# Patient Record
Sex: MALE | Race: WHITE | NOT HISPANIC OR LATINO | Employment: FULL TIME | URBAN - METROPOLITAN AREA
[De-identification: names, ages, dates, MRNs, and addresses within clinical notes are randomized per-mention and may not be internally consistent; named-entity substitution may affect disease eponyms.]

---

## 2017-01-29 ENCOUNTER — NURSE TRIAGE (OUTPATIENT)
Dept: ADMINISTRATIVE | Facility: CLINIC | Age: 47
End: 2017-01-29

## 2017-01-29 ENCOUNTER — HOSPITAL ENCOUNTER (EMERGENCY)
Facility: HOSPITAL | Age: 47
Discharge: HOME | End: 2017-01-29
Attending: SURGERY
Payer: MEDICAID

## 2017-01-29 VITALS
TEMPERATURE: 98 F | DIASTOLIC BLOOD PRESSURE: 72 MMHG | WEIGHT: 176 LBS | HEIGHT: 69 IN | BODY MASS INDEX: 26.07 KG/M2 | SYSTOLIC BLOOD PRESSURE: 126 MMHG | HEART RATE: 72 BPM | RESPIRATION RATE: 20 BRPM

## 2017-01-29 DIAGNOSIS — M79.662 PAIN OF LEFT CALF: ICD-10-CM

## 2017-01-29 LAB
ALBUMIN SERPL BCP-MCNC: 4.2 G/DL
ALP SERPL-CCNC: 78 U/L
ALT SERPL W/O P-5'-P-CCNC: 20 U/L
ANION GAP SERPL CALC-SCNC: 11 MMOL/L
AST SERPL-CCNC: 19 U/L
BASOPHILS # BLD AUTO: 0.02 K/UL
BASOPHILS NFR BLD: 0.3 %
BILIRUB SERPL-MCNC: 0.8 MG/DL
BUN SERPL-MCNC: 20 MG/DL
CALCIUM SERPL-MCNC: 9 MG/DL
CHLORIDE SERPL-SCNC: 102 MMOL/L
CO2 SERPL-SCNC: 26 MMOL/L
CREAT SERPL-MCNC: 1 MG/DL
DIFFERENTIAL METHOD: ABNORMAL
EOSINOPHIL # BLD AUTO: 0.1 K/UL
EOSINOPHIL NFR BLD: 1.3 %
ERYTHROCYTE [DISTWIDTH] IN BLOOD BY AUTOMATED COUNT: 12.6 %
EST. GFR  (AFRICAN AMERICAN): >60 ML/MIN/1.73 M^2
EST. GFR  (NON AFRICAN AMERICAN): >60 ML/MIN/1.73 M^2
GLUCOSE SERPL-MCNC: 108 MG/DL
HCT VFR BLD AUTO: 39.7 %
HGB BLD-MCNC: 13.5 G/DL
LYMPHOCYTES # BLD AUTO: 2.1 K/UL
LYMPHOCYTES NFR BLD: 27 %
MCH RBC QN AUTO: 30.3 PG
MCHC RBC AUTO-ENTMCNC: 34 %
MCV RBC AUTO: 89 FL
MONOCYTES # BLD AUTO: 0.7 K/UL
MONOCYTES NFR BLD: 8.7 %
NEUTROPHILS # BLD AUTO: 4.8 K/UL
NEUTROPHILS NFR BLD: 62.7 %
PLATELET # BLD AUTO: 202 K/UL
PMV BLD AUTO: 9.5 FL
POTASSIUM SERPL-SCNC: 4 MMOL/L
PROT SERPL-MCNC: 7.4 G/DL
RBC # BLD AUTO: 4.45 M/UL
SODIUM SERPL-SCNC: 139 MMOL/L
WBC # BLD AUTO: 7.59 K/UL

## 2017-01-29 PROCEDURE — 85025 COMPLETE CBC W/AUTO DIFF WBC: CPT

## 2017-01-29 PROCEDURE — 36415 COLL VENOUS BLD VENIPUNCTURE: CPT

## 2017-01-29 PROCEDURE — 80053 COMPREHEN METABOLIC PANEL: CPT

## 2017-01-29 PROCEDURE — 99284 EMERGENCY DEPT VISIT MOD MDM: CPT

## 2017-01-29 NOTE — ED AVS SNAPSHOT
[facility]  [address] [address]               Jordan Prater   2017 10:56 PM   ED    Description:  Male : 1970   Department:  [facility]           Your Care was Coordinated By:     Provider Role From To    Michael Cardona MD Attending Provider 17 0176 --      Reason for Visit     Leg Pain           Diagnoses this Visit        Comments    Pain of left calf           ED Disposition     ED Disposition Condition Comment    Discharge             To Do List           Follow-up Information     Follow up with Anahy Marino MD. Schedule an appointment as soon as possible for a visit in 2 days.    Specialty:  Family Medicine    Contact information:    [address]  [facility] [id number] [phone]        [facility]     [facility] -  Assistance  Registered nurses in the [facility] provide clinical advisement, health education, appointment booking, and other advisory services.  Call for this free service at [phone].             Medications           Message regarding Medications     Verify the changes and/or additions to your medication regime listed below are the same as discussed with your clinician today.  If any of these changes or additions are incorrect, please notify your healthcare provider.             Verify that the below list of medications is an accurate representation of the medications you are currently taking.  If none reported, the list may be blank. If incorrect, please contact your healthcare provider. Carry this list with you in case of emergency.           Current Medications     escitalopram oxalate (LEXAPRO) 20 MG tablet Take 10 mg by mouth once daily.    ibuprofen (ADVIL,MOTRIN) 800 MG tablet Take 800 mg by mouth every 6 (six) hours as needed for Pain.           Clinical Reference Information           Your Vitals Were     BP Pulse Temp Height Weight BMI    134/89  "(BP Location: Left arm, Patient Position: Sitting) 84 97.3 °F (36.3 °C) (Oral) 5' 9" (1.753 m) 79.8 kg (176 lb) 25.99 kg/m2      Allergies as of 1/29/2017        Reactions    Chlorhexidine Towelette Itching    Tramadol Itching    Codeine Other (See Comments)    delusional      Immunizations Administered on Date of Encounter - 1/29/2017     None      ED Micro, Lab, POCT     Start Ordered       Status Ordering Provider    01/29/17 2254 01/29/17 2254  Comprehensive metabolic panel  STAT      Final result     01/29/17 2254 01/29/17 2254  CBC auto differential  STAT      Final result       ED Imaging Orders     Start Ordered       Status Ordering Provider    01/29/17 2254 01/29/17 2254  US Lower Extremity Veins Left  1 time imaging      In process         Discharge Instructions         Possible Causes of Low Back or Leg Pain    The symptoms in your back or leg may be due to pressure on a nerve. This pressure may be caused by a damaged disk or by abnormal bone growth. Either way, you may feel pain, burning, tingling, or numbness. If you have pressure on a nerve that connects to the sciatic nerve, pain may shoot down your leg.    Pressure from the disk  Constant wear and tear can weaken a disk over time and cause back pain. The disk can then be damaged by a sudden movement or injury. If its soft center begins to bulge, the disk may press on a nerve. Or the outside of the disk may tear, and the soft center may squeeze through and pinch a nerve.    Pressure from bone  As a disk wears out, the vertebrae right above and below the disk begin to touch. This can put pressure on a nerve. Often, abnormal bone (called bone spurs) grows where the vertebrae rub against each other. This can cause the foramen or the spinal canal to narrow (called stenosis) and press against a nerve.  © 3411-1486 [employer] [address] [place] [address], [place] [address]. All rights reserved. This information is not intended as a substitute for " professional medical care. Always follow your healthcare professional's instructions.          Your Scheduled Appointments     Feb 02, 2017 11:30 AM CST   Mri Lower Ext W/O with [facility] MRI1   [facility] ([facility])    [address] [place] [id number] [phone]            Apr 17, 2017  9:45 AM CDT   Established Patient Visit with [facility] DEISI POWERS  Orthopedics ([facility])    [address] [facility] [address] [phone]              Smoking Cessation     If you would like to quit smoking:   You may be eligible for free services if you are a [place] resident and started smoking cigarettes before September 1, 1988.  Call the Smoking Cessation Trust (SCT) toll free at (547) 009-5496 or (601) 159-8347.   Call 1-800-QUIT-NOW if you do not meet the above criteria.             [facility] complies with applicable Federal civil rights laws and does not discriminate on the basis of race, color, national origin, age, disability, or sex.        Language Assistance Services     ATTENTION: Language assistance services are available, free of charge. Please call [phone].      ATENCIÓN: Si habla español, tiene a moran disposición servicios gratuitos de asistencia lingüística. Llame al [phone].     CHÚ Ý: N?u b?n nói Ti?ng Vi?t, có các d?ch v? h? tr? ngôn ng? mi?n phí dành cho b?n. G?i s? [phone].

## 2017-01-30 NOTE — ED TRIAGE NOTES
Patient had ACL surgery in October on his left knee.  He now has a reddened/bruised area approx 1cm in diameter surrounded by a pinkish rash looking area to his left calf.  It is not hot to touch or painful.

## 2017-01-30 NOTE — DISCHARGE INSTRUCTIONS
Possible Causes of Low Back or Leg Pain    The symptoms in your back or leg may be due to pressure on a nerve. This pressure may be caused by a damaged disk or by abnormal bone growth. Either way, you may feel pain, burning, tingling, or numbness. If you have pressure on a nerve that connects to the sciatic nerve, pain may shoot down your leg.    Pressure from the disk  Constant wear and tear can weaken a disk over time and cause back pain. The disk can then be damaged by a sudden movement or injury. If its soft center begins to bulge, the disk may press on a nerve. Or the outside of the disk may tear, and the soft center may squeeze through and pinch a nerve.    Pressure from bone  As a disk wears out, the vertebrae right above and below the disk begin to touch. This can put pressure on a nerve. Often, abnormal bone (called bone spurs) grows where the vertebrae rub against each other. This can cause the foramen or the spinal canal to narrow (called stenosis) and press against a nerve.  © 4997-0072 [employer] [address] [place] [address], [place] [address]. All rights reserved. This information is not intended as a substitute for professional medical care. Always follow your healthcare professional's instructions.

## 2017-01-30 NOTE — ED PROVIDER NOTES
[facility]                                        January 29, 2017                     Chief Complaint  46 y.o. male with Leg Pain (reddened area to left calf area, worried it may be a blood clot)    History of Present Illness  Jordan Prater presents to the emergency room with left calf pain tonight  Patient had left ACL surgery in October 2016 with continued postop issues  Patient states he has not been able to weight-bear on his left leg since surgery  Patient noticed mild left calf tenderness tonight, worried about a blood clot now  Patient has mild tenderness to left calf with a negative Homans sign noted now  Pt has good distal pulses and capillary refill, neurovascularly intact on exam  Patient states she has an MRI scheduled next week to reassess his left knee    The history is provided by the patient  Past medical history: Bipolar disorder  Past surgical history: Appendectomy, tonsillectomy, hernia surgery, ACL repair  ALLERGIES: Chlorhexidine, tramadol, codeine    Review of Systems and Physical Exam     Review of Systems  -- Constitution - no fever, denies fatigue, no weakness, no chills  -- Eyes - no tearing or redness, no visual disturbance  -- Ear, Nose - no tinnitus or earache, no nasal congestion or discharge  -- Mouth,Throat - no sore throat, no toothache, normal voice, normal swallowing  -- Respiratory - denies cough and congestion, no shortness of breath, no KAUFMAN  -- Cardiovascular - denies chest pain, no palpitations, denies claudication  -- Gastrointestinal - denies abdominal pain, nausea, vomiting, or diarrhea  -- Musculoskeletal - left calf pain  -- Neurological - no headache, denies weakness or seizure; no LOC  -- Skin - denies pallor, rash, or changes in skin. no hives or welts noted    Vital Signs  -- His blood pressure is 134/89 and his pulse is 84.      Physical Exam  -- Nursing note and vitals reviewed  -- Constitutional: Appears well-developed and well-nourished  --  Head: Atraumatic. Normocephalic. No obvious abnormality  -- Eyes: Pupils are equal and reactive to light. Normal conjunctiva and lids  -- Cardiac: Normal rate, regular rhythm and normal heart sounds  -- Pulmonary: Normal respiratory effort, breath sounds clear to auscultation  -- Abdominal: Soft, no tenderness. Normal bowel sounds. Normal liver edge  -- Musculoskeletal: Minimal left calf swelling and tenderness, negative Homans sign  -- Neurological: No focal deficits. Showed good interaction with staff  -- Vascular: Posterior tibial, dorsalis pedis and radial pulses 2+ bilaterally      Emergency Room Course     Treatment and Evaluation  -- The US of the lower extremity performed in the ER today was negative for DVT    -- The electrolytes drawn in the ER today were within normal limits   -- The CBC drawn in the ER today was within normal limits     Diagnosis  -- The encounter diagnosis was Pain of left calf.    Disposition and Plan  -- Disposition: home  -- Condition: stable  -- Follow-up: Patient to follow up with Anahy Marino MD in 1-2 days.  -- I advised the patient that we have found no life threatening condition today  -- At this time, I believe the patient is clinically stable for discharge.   -- The patient acknowledges that close follow up with a MD is required   -- Patient agrees to comply with all instruction and direction given in the ER    This note is dictated on Dragon Natural Speaking word recognition program.  There are word recognition mistakes that are occasionally missed on review.           Michael Cardona MD  01/29/17 0372

## 2017-01-30 NOTE — TELEPHONE ENCOUNTER
Reason for Disposition   Nursing judgment    Protocols used: ST NO GUIDELINE OR REFERENCE [id number]    Patient states his left calf is swollen and red and he thinks that it may be a blood clot but it is not painful. He states his feet are cold. Advised patient to go the ED for evaluation. He verbalized understanding.

## 2017-02-02 ENCOUNTER — HOSPITAL ENCOUNTER (OUTPATIENT)
Dept: RADIOLOGY | Facility: HOSPITAL | Age: 47
Discharge: HOME | End: 2017-02-02
Attending: ORTHOPAEDIC SURGERY
Payer: OTHER MISCELLANEOUS

## 2017-02-02 DIAGNOSIS — M25.662 KNEE STIFF, LEFT: ICD-10-CM

## 2017-02-02 DIAGNOSIS — Z98.890 HISTORY OF KNEE SURGERY: ICD-10-CM

## 2017-02-02 PROCEDURE — 73721 MRI JNT OF LWR EXTRE W/O DYE: CPT | Mod: 26,LT,, | Performed by: RADIOLOGY

## 2017-02-02 PROCEDURE — 73721 MRI JNT OF LWR EXTRE W/O DYE: CPT | Mod: TC,LT

## 2017-03-21 ENCOUNTER — HOSPITAL ENCOUNTER (EMERGENCY)
Facility: HOSPITAL | Age: 47
Discharge: HOME | End: 2017-03-21
Attending: SURGERY
Payer: MEDICAID

## 2017-03-21 VITALS
WEIGHT: 176 LBS | TEMPERATURE: 96 F | HEART RATE: 65 BPM | OXYGEN SATURATION: 100 % | DIASTOLIC BLOOD PRESSURE: 87 MMHG | BODY MASS INDEX: 26.07 KG/M2 | RESPIRATION RATE: 15 BRPM | HEIGHT: 69 IN | SYSTOLIC BLOOD PRESSURE: 128 MMHG

## 2017-03-21 DIAGNOSIS — R07.89 CHEST PAIN, NON-CARDIAC: Primary | ICD-10-CM

## 2017-03-21 LAB
ALBUMIN SERPL BCP-MCNC: 4.1 G/DL
ALP SERPL-CCNC: 91 U/L
ALT SERPL W/O P-5'-P-CCNC: 29 U/L
ANION GAP SERPL CALC-SCNC: 11 MMOL/L
APTT BLDCRRT: 27 SEC
AST SERPL-CCNC: 21 U/L
BASOPHILS # BLD AUTO: 0.02 K/UL
BASOPHILS NFR BLD: 0.3 %
BILIRUB SERPL-MCNC: 0.9 MG/DL
BNP SERPL-MCNC: <10 PG/ML
BUN SERPL-MCNC: 18 MG/DL
CALCIUM SERPL-MCNC: 9.4 MG/DL
CHLORIDE SERPL-SCNC: 103 MMOL/L
CK MB SERPL-MCNC: 0.6 NG/ML
CK MB SERPL-RTO: 0.9 %
CK SERPL-CCNC: 64 U/L
CK SERPL-CCNC: 64 U/L
CO2 SERPL-SCNC: 28 MMOL/L
CREAT SERPL-MCNC: 0.9 MG/DL
D DIMER PPP IA.FEU-MCNC: 0.67 MG/L FEU
DIFFERENTIAL METHOD: NORMAL
EOSINOPHIL # BLD AUTO: 0 K/UL
EOSINOPHIL NFR BLD: 0.6 %
ERYTHROCYTE [DISTWIDTH] IN BLOOD BY AUTOMATED COUNT: 12.9 %
EST. GFR  (AFRICAN AMERICAN): >60 ML/MIN/1.73 M^2
EST. GFR  (NON AFRICAN AMERICAN): >60 ML/MIN/1.73 M^2
GLUCOSE SERPL-MCNC: 90 MG/DL
HCT VFR BLD AUTO: 41.4 %
HGB BLD-MCNC: 14.1 G/DL
INR PPP: 0.9
LYMPHOCYTES # BLD AUTO: 1.2 K/UL
LYMPHOCYTES NFR BLD: 18.7 %
MCH RBC QN AUTO: 30.4 PG
MCHC RBC AUTO-ENTMCNC: 34.1 %
MCV RBC AUTO: 89 FL
MONOCYTES # BLD AUTO: 0.5 K/UL
MONOCYTES NFR BLD: 8.4 %
NEUTROPHILS # BLD AUTO: 4.6 K/UL
NEUTROPHILS NFR BLD: 72 %
PLATELET # BLD AUTO: 235 K/UL
PMV BLD AUTO: 9.4 FL
POTASSIUM SERPL-SCNC: 4.1 MMOL/L
PROT SERPL-MCNC: 7.8 G/DL
PROTHROMBIN TIME: 9.4 SEC
RBC # BLD AUTO: 4.64 M/UL
SODIUM SERPL-SCNC: 142 MMOL/L
TROPONIN I SERPL DL<=0.01 NG/ML-MCNC: <0.006 NG/ML
TROPONIN I SERPL DL<=0.01 NG/ML-MCNC: <0.006 NG/ML
WBC # BLD AUTO: 6.42 K/UL

## 2017-03-21 PROCEDURE — 85025 COMPLETE CBC W/AUTO DIFF WBC: CPT

## 2017-03-21 PROCEDURE — 25500020 PHARM REV CODE 255: Performed by: SURGERY

## 2017-03-21 PROCEDURE — 63600175 PHARM REV CODE 636 W HCPCS: Performed by: SURGERY

## 2017-03-21 PROCEDURE — 99285 EMERGENCY DEPT VISIT HI MDM: CPT | Mod: 25

## 2017-03-21 PROCEDURE — 83880 ASSAY OF NATRIURETIC PEPTIDE: CPT

## 2017-03-21 PROCEDURE — 80053 COMPREHEN METABOLIC PANEL: CPT

## 2017-03-21 PROCEDURE — 96372 THER/PROPH/DIAG INJ SC/IM: CPT

## 2017-03-21 PROCEDURE — 85379 FIBRIN DEGRADATION QUANT: CPT

## 2017-03-21 PROCEDURE — 93005 ELECTROCARDIOGRAM TRACING: CPT

## 2017-03-21 PROCEDURE — 25000003 PHARM REV CODE 250: Performed by: SURGERY

## 2017-03-21 PROCEDURE — 84484 ASSAY OF TROPONIN QUANT: CPT

## 2017-03-21 PROCEDURE — 93010 ELECTROCARDIOGRAM REPORT: CPT | Mod: ,,, | Performed by: INTERNAL MEDICINE

## 2017-03-21 PROCEDURE — 85610 PROTHROMBIN TIME: CPT

## 2017-03-21 PROCEDURE — 85730 THROMBOPLASTIN TIME PARTIAL: CPT

## 2017-03-21 PROCEDURE — 27000494 *HC OXYGEN SET UP (STAH ONLY)

## 2017-03-21 PROCEDURE — 82553 CREATINE MB FRACTION: CPT

## 2017-03-21 PROCEDURE — 36415 COLL VENOUS BLD VENIPUNCTURE: CPT

## 2017-03-21 RX ORDER — PANTOPRAZOLE SODIUM 40 MG/1
40 TABLET, DELAYED RELEASE ORAL DAILY
Qty: 30 TABLET | Refills: 0 | Status: SHIPPED | OUTPATIENT
Start: 2017-03-21 | End: 2018-05-20

## 2017-03-21 RX ORDER — OXYCODONE AND ACETAMINOPHEN 10; 325 MG/1; MG/1
1 TABLET ORAL EVERY 4 HOURS PRN
COMMUNITY
End: 2017-06-20

## 2017-03-21 RX ORDER — NAPROXEN SODIUM 220 MG/1
81 TABLET, FILM COATED ORAL
Status: COMPLETED | OUTPATIENT
Start: 2017-03-21 | End: 2017-03-21

## 2017-03-21 RX ORDER — ENOXAPARIN SODIUM 100 MG/ML
1 INJECTION SUBCUTANEOUS
Status: COMPLETED | OUTPATIENT
Start: 2017-03-21 | End: 2017-03-21

## 2017-03-21 RX ORDER — HYDROCODONE BITARTRATE AND ACETAMINOPHEN 5; 325 MG/1; MG/1
1 TABLET ORAL EVERY 6 HOURS PRN
COMMUNITY
End: 2017-06-20

## 2017-03-21 RX ADMIN — IOHEXOL 75 ML: 350 INJECTION, SOLUTION INTRAVENOUS at 10:03

## 2017-03-21 RX ADMIN — ASPIRIN 81 MG: 81 TABLET, CHEWABLE ORAL at 09:03

## 2017-03-21 RX ADMIN — ENOXAPARIN SODIUM 80 MG: 80 INJECTION, SOLUTION INTRAVENOUS; SUBCUTANEOUS at 11:03

## 2017-03-21 NOTE — CONSULTS
[facility]  Cardiology  Consult Note    Patient Name: Jordan Prater  MRN: [id number]  Admission Date: 3/21/2017  Hospital Length of Stay: 0 days  Code Status: No Order   Attending Provider: No att. providers found   Consulting Provider: Hardeep Nunez NP  Primary Care Physician: Anahy Marino MD  Principal Problem:<principal problem not specified>        Inpatient consult to [facility]  Consult performed by: CHINO MARTIN  Consult ordered by: MEDINA HERNÁNDEZ        Subjective:     Chief Complaint:  CP    HPI: 47 y/o male with PMHx of bipolar disorder presents to the ED with c/o left sided chest pain into his axilla area. Pt also states that hid pain in worse with deep inspiration. He also presents with c/o KAUFMAN with moderate exertion. EKG with no signs of ischemia, Osbaldo negative, but CT non conclusive for PE. Of note, pt has recently undergone left knee sx. [facility] asked to evaluate.     Past Medical History:   Diagnosis Date    ACL tear     Bipolar 1 disorder     Tobacco abuse        Past Surgical History:   Procedure Laterality Date    ANTERIOR CRUCIATE LIGAMENT REPAIR Left 10/14/2016    APPENDECTOMY      HERNIA REPAIR      TONSILLECTOMY         Review of patient's allergies indicates:   Allergen Reactions    Chlorhexidine towelette Itching    Tramadol Itching    Codeine Other (See Comments)     delusional       No current facility-administered medications on file prior to encounter.      Current Outpatient Prescriptions on File Prior to Encounter   Medication Sig    escitalopram oxalate (LEXAPRO) 20 MG tablet Take 10 mg by mouth once daily.    ibuprofen (ADVIL,MOTRIN) 800 MG tablet Take 800 mg by mouth every 6 (six) hours as needed for Pain.     Family History     Problem Relation (Age of Onset)    Heart attack Father    No Known Problems Mother, Maternal Grandmother, Maternal Grandfather, Paternal Grandmother, Paternal Grandfather        Social History Main Topics     Smoking status: Former Smoker     Years: 25.00    Smokeless tobacco: Current User     Types: Chew    Alcohol use Yes      Comment: occ    Drug use: No    Sexual activity: Yes     Partners: Female     ROS     Constitutional : Negative  EENT : Negative  CV : Negative  Respiratory : Negative  Gastrointestinal: Negative   Genitourinary: Negative  Musculoskeletal: left knee immobilizer   Skin : Negative  Neurological : AAO x 3     Objective:     Vital Signs (Most Recent):  Temp: 96 °F (35.6 °C) (03/21/17 0938)  Pulse: 65 (03/21/17 1145)  Resp: 15 (03/21/17 1145)  BP: 128/87 (03/21/17 1145)  SpO2: 100 % (03/21/17 1145) Vital Signs (24h Range):  Temp:  [96 °F (35.6 °C)] 96 °F (35.6 °C)  Pulse:  [65-82] 65  Resp:  [14-27] 15  SpO2:  [100 %] 100 %  BP: (122-131)/(78-90) 128/87     Weight: 79.8 kg (176 lb)  Body mass index is 25.99 kg/(m^2).    SpO2: 100 %       No intake or output data in the 24 hours ending 03/21/17 1216    Lines/Drains/Airways     Peripheral Intravenous Line                 Peripheral IV - Single Lumen 03/21/17 1033 Left Antecubital less than 1 day                Physical Exam     General appearance: alert, appears stated age and cooperative  Head: Normocephalic, without obvious abnormality, atraumatic  Eyes: conjunctivae/corneas clear. PERRL  Neck: no carotid bruit, no JVD and supple, symmetrical, trachea midline  Lungs: clear to auscultation bilaterally, normal respiratory effort  Chest Wall: no tenderness  Heart: regular rate and rhythm, S1, S2 normal, no murmur, click, rub or gallop  Abdomen: soft, non-tender; bowel sounds normal; no masses,  no organomegaly  Extremities: Extremities normal, atraumatic, no cyanosis, clubbing, or edema  Pulses: Dorsalis Pedis R: 2+ (normal)/L: 2+ (normal)  Skin: Skin color, texture, turgor normal. No rashes or lesions  Neurologic: Normal mood and affect  Alert and oriented X 3      Significant Labs:   ABG: No results for input(s): PH, PCO2, HCO3, POCSATURATED, BE in  the last 48 hours., Blood Culture: No results for input(s): LABBLOO in the last 48 hours., BMP:   Recent Labs  Lab 03/21/17  0953   GLU 90      K 4.1      CO2 28   BUN 18   CREATININE 0.9   CALCIUM 9.4   , CMP   Recent Labs  Lab 03/21/17  0953      K 4.1      CO2 28   GLU 90   BUN 18   CREATININE 0.9   CALCIUM 9.4   PROT 7.8   ALBUMIN 4.1   BILITOT 0.9   ALKPHOS 91   AST 21   ALT 29   ANIONGAP 11   ESTGFRAFRICA >60   EGFRNONAA >60   , CBC   Recent Labs  Lab 03/21/17  0953   WBC 6.42   HGB 14.1   HCT 41.4      , INR   Recent Labs  Lab 03/21/17  0953   INR 0.9   , Lipid Panel No results for input(s): CHOL, HDL, LDLCALC, TRIG, CHOLHDL in the last 48 hours.,   Pathology Results  (Last 10 years)               03/21/17 0953  CBC auto differential Final result    01/29/17 2304 (A) CBC auto differential (Abnormal) Final result    09/26/16 1121 (A) CBC auto differential (Abnormal) Final result       and Troponin   Recent Labs  Lab 03/21/17  0953 03/21/17  1142   TROPONINI <0.006 <0.006         Assessment and Plan:     There are no hospital problems to display for this patient.      VTE Risk Mitigation     None          Atypical chest pain and Dyspnea on exertion  CT suggestive of PE  S/p L knee surgery 10/16 and again 2 wks ago  Normal ETT 2/16 LVEF 60% PAS 13, ca score 0  Bipolar disease Hx  Hardeep Nunez, YUDELKA  Cardiology   [facility]    PLAN: anticoagulate for 6 months    I attest that I have personally seen and examined this patient. I have reviewed and discussed the management in detail as outlined above.

## 2017-03-21 NOTE — ED AVS SNAPSHOT
[facility] [address] [place] [address]               Jordan Prater   3/21/2017  9:33 AM   ED    Description:  Male : 1970   Department:  [facility]           Your Care was Coordinated By:     Provider Role From To    Michael Cardona MD Attending Provider 17 0928 --      Reason for Visit     Nausea     Chest Pain           Diagnoses this Visit        Comments    Chest pain, non-cardiac    -  Primary       ED Disposition     ED Disposition Condition Comment    Discharge             To Do List           Follow-up Information     Follow up with ANNA BUSBY. Schedule an appointment as soon as possible for a visit in 2 days.    Contact information:    [address] [place] [address]  [place] [address] [phone]         These Medications        Disp Refills Start End    pantoprazole (PROTONIX) 40 MG tablet 30 tablet 0 3/21/2017 2017    Take 1 tablet (40 mg total) by mouth once daily. - Oral    Pharmacy: [address] Ph #: [phone]       apixaban 5 mg Tab 28 tablet 0 3/21/2017 3/28/2017    Take 2 tablets (10 mg total) by mouth 2 (two) times daily. - Oral    Pharmacy: [address] Ph #: [phone]         [facility] On Call     [facility] On Call Nurse Care Line -  Assistance  Registered nurses in the [facility] provide clinical advisement, health education, appointment booking, and other advisory services.  Call for this free service at [phone].             Medications           Message regarding Medications     Verify the changes and/or additions to your medication regime listed below are the same as discussed with your clinician today.  If any of these changes or additions are incorrect, please notify your healthcare provider.        START taking these NEW medications        Refills    pantoprazole (PROTONIX) 40 MG tablet 0    Sig: Take 1  "tablet (40 mg total) by mouth once daily.    Class: Normal    Route: Oral    apixaban 5 mg Tab 0    Sig: Take 2 tablets (10 mg total) by mouth 2 (two) times daily.    Class: Normal    Route: Oral      These medications were administered today        Dose Freq    aspirin chewable tablet 81 mg 81 mg ED 1 Time    Sig: Take 1 tablet (81 mg total) by mouth ED 1 Time.    Class: Normal    Route: Oral    omnipaque 350 iohexol 75 mL 75 mL IMG once as needed    Sig: Inject 75 mLs into the vein ONCE PRN for contrast.    Class: Normal    Route: Intravenous    enoxaparin injection 80 mg 1 mg/kg × 79.8 kg ED 1 Time    Sig: Inject 0.8 mLs (80 mg total) into the skin ED 1 Time.    Class: Normal    Route: Subcutaneous           Verify that the below list of medications is an accurate representation of the medications you are currently taking.  If none reported, the list may be blank. If incorrect, please contact your healthcare provider. Carry this list with you in case of emergency.           Current Medications     escitalopram oxalate (LEXAPRO) 20 MG tablet Take 10 mg by mouth once daily.    apixaban 5 mg Tab Take 2 tablets (10 mg total) by mouth 2 (two) times daily.    hydrocodone-acetaminophen 5-325mg (NORCO) 5-325 mg per tablet Take 1 tablet by mouth every 6 (six) hours as needed for Pain.    ibuprofen (ADVIL,MOTRIN) 800 MG tablet Take 800 mg by mouth every 6 (six) hours as needed for Pain.    oxycodone-acetaminophen (PERCOCET)  mg per tablet Take 1 tablet by mouth every 4 (four) hours as needed for Pain.    pantoprazole (PROTONIX) 40 MG tablet Take 1 tablet (40 mg total) by mouth once daily.           Clinical Reference Information           Your Vitals Were     BP Pulse Temp Resp Height Weight    128/87 65 96 °F (35.6 °C) 15 5' 9" (1.753 m) 79.8 kg (176 lb)    SpO2 BMI             100% 25.99 kg/m2         Allergies as of 3/21/2017        Reactions    Chlorhexidine Towelette Itching    Tramadol Itching    Codeine Other " (See Comments)    delusional      Immunizations Administered on Date of Encounter - 3/21/2017     None      ED Micro, Lab, POCT     Start Ordered       Status Ordering Provider    03/21/17 1128 03/21/17 1127  Troponin I  STAT      Final result     03/21/17 0932 03/21/17 0932  Comprehensive metabolic panel  STAT      Final result     03/21/17 0932 03/21/17 0932  CBC auto differential  STAT      Final result     03/21/17 0932 03/21/17 0932  Troponin I  Now then every 6 hours     Start Status   03/21/17 0932 Final result   03/21/17 1532 Acknowledged   03/21/17 2132 Scheduled   03/22/17 0332 Scheduled   03/22/17 0932 Scheduled   03/22/17 1532 Scheduled   03/22/17 2132 Scheduled   03/23/17 0332 Scheduled   03/23/17 0932 Scheduled   03/23/17 1532 Scheduled   03/23/17 2132 Scheduled   03/24/17 0332 Scheduled   03/24/17 0932 Scheduled   03/24/17 1532 Scheduled   03/24/17 2132 Scheduled       Acknowledged     03/21/17 0932 03/21/17 0932  CK  STAT      Final result     03/21/17 0932 03/21/17 0932  CK-MB  STAT      Final result     03/21/17 0932 03/21/17 0932  Brain natriuretic peptide  STAT      Final result     03/21/17 0932 03/21/17 0932  APTT  STAT      Final result     03/21/17 0932 03/21/17 0932  Protime-INR  STAT      Final result     03/21/17 0932 03/21/17 0932  D dimer, quantitative  STAT      Final result       ED Imaging Orders     Start Ordered       Status Ordering Provider    03/21/17 1103 03/21/17 1103  US Lower Extremity Veins Bilateral  1 time imaging      Final result     03/21/17 1023 03/21/17 1022  CTA Chest Non-Coronary  1 time imaging      Final result     03/21/17 0932 03/21/17 0932  X-Ray Chest 1 View  1 time imaging      Final result         Discharge Instructions         Noncardiac Chest Pain    Based on your visit today, the health care provider doesnt know what is causing your chest pain. In most cases, people who come to the emergency department with chest pain dont have a problem with their  heart. Instead, the pain is caused by other conditions. These may be problems with the lungs, muscles, bones, digestive tract, nerves, or mental health.  Lung problems  · Inflammation around the lungs (pleurisy)  · Collapsed lung (pneumothorax)  · Fluid around the lungs (pleural effusion)  · Lung cancer. This is a rare cause of chest pain.  Muscle or bone problems  · Inflamed cartilage between the ribs (pleurisy)  · Fibromyalgia  · Rheumatoid arthritis  Digestive system problems  · Reflux  · Stomach ulcer  · Spasms of the esophagus  · Gall stones  · Gallbladder inflammation  Mental health conditions  · Panic or anxiety attacks  · Emotional distress  Your condition doesnt seem serious and your pain doesnt appear to be coming from your heart. But sometimes the signs of a serious problem take more time to appear. Watch for the warning signs listed below.  Home care  Follow these guidelines when caring for yourself at home:  · Rest today and avoid strenuous activity.  · Take any prescribed medicine as directed.  Follow-up care  Follow up with your health care provider, or as advised, if you dont start to feel better within 24 hours.  When to seek medical advice  Call your health care provider right away if any of these occur:  · A change in the type of pain. Call if it feels different, becomes more serious, lasts longer, or begins to spread into your shoulder, arm, neck, jaw, or back.  · Shortness of breath  · You feel more pain when you breathe  · Cough with dark-colored mucus or blood  · Weakness, dizziness, or fainting  · Fever of 100.4ºF (38ºC) or higher, or as directed by your health care provider  · Swelling, pain, or redness in one leg  Date Last Reviewed: 11/24/2014 © 2000-2016 [employer]. [address] [place] [address], [place] [address]. All rights reserved. This information is not intended as a substitute for professional medical care. Always follow your healthcare professional's  instructions.          Your Scheduled Appointments     Apr 17, 2017  9:45 AM CDT   Established Patient Visit with ORTHO DEISI POWERS - Orthopedics ([facility])    [address] [place] [address]  [place] [address] [phone]              Smoking Cessation     If you would like to quit smoking:   You may be eligible for free services if you are a [place] resident and started smoking cigarettes before September 1, 1988.  Call the Smoking Cessation Trust (SCT) toll free at (308) 954-5382 or (275) 875-3745.   Call 5-923-QUIT-NOW if you do not meet the above criteria.             [facility] complies with applicable Federal civil rights laws and does not discriminate on the basis of race, color, national origin, age, disability, or sex.        Language Assistance Services     ATTENTION: Language assistance services are available, free of charge. Please call [phone].      ATENCIÓN: Si habla español, tiene a moran disposición servicios gratuitos de asistencia lingüística. Llame al [phone].     CHÚ Ý: N?u b?n nói Ti?ng Vi?t, có các d?ch v? h? tr? ngôn ng? mi?n phí dành cho b?n. G?i s? [phone].

## 2017-03-21 NOTE — ED PROVIDER NOTES
[facility]                                        March 21, 2017                   Chief Complaint  46 y.o. male with Nausea and Chest Pain     History of Present Illness  Jordan Prater presents to the emergency room with chest pain for the last 3 days  Patient states he's been having anxiety, under severe stress since his left knee injury   Patient has not been able to work for the last year, very stressed out this past week  Patient is normal sinus rhythm on EKG without ST changes or concerning findings  Patient has 2 sets of negative cardiac enzymes here with a mildly elevated d-dimer  Patient had a possible PE on the left side, [facility] consulted for evaluation today in ER    The history is provided by the patient  Medical history: Anterior cruciate ligament tear, bipolar, tobacco issues  Surgical history: Anterior cruciate ligament repair, appendectomy, hernia repair, tonsillectomy  ALLERGIES: Chlorhexidine, Ultram, codeine    Review of Systems and Physical Exam     Review of Systems  -- Constitution - no fever, denies fatigue, no weakness, no chills  -- Eyes - no tearing or redness, no visual disturbance  -- Ear, Nose - no tinnitus or earache, no nasal congestion or discharge  -- Mouth,Throat - no sore throat, no toothache, normal voice, normal swallowing  -- Respiratory - denies cough and congestion, no shortness of breath, no KAUFMAN  -- Cardiovascular - chest pain, no palpitations, denies claudication  -- Gastrointestinal - denies abdominal pain, nausea, vomiting, or diarrhea  -- Musculoskeletal - denies back pain, negative for myalgias and arthralgias   -- Neurological - no headache, denies weakness or seizure; no LOC  -- Skin - denies pallor, rash, or changes in skin. no hives or welts noted    Vital Signs  -- His temperature is 96 °F (35.6 °C).   -- His blood pressure is 128/87 and his pulse is 65.   -- His respiration is 15 and oxygen saturation is 100%.      Physical Exam  -- Nursing note  and vitals reviewed  -- Head: Atraumatic. Normocephalic. No obvious abnormality  -- Eyes: Pupils are equal and reactive to light. Normal conjunctiva and lids  -- Cardiac: Normal rate, regular rhythm and normal heart sounds  -- Pulmonary: Normal respiratory effort, breath sounds clear to auscultation  -- Abdominal: Soft, no tenderness. Normal bowel sounds. Normal liver edge  -- Musculoskeletal: Normal range of motion, no effusions. Joints stable   -- Neurological: No focal deficits. Showed good interaction with staff  -- Vascular: Posterior tibial, dorsalis pedis and radial pulses 2+ bilaterally      Emergency Room Course     CT PE  -- Focal filling defect versus pseudo-defect involving segmental branches of the lingula   -- and anterior segmental branch of the left lower lobe.  It is difficult to fully evaluate secondary   -- to respiratory motion artifact in this location.  Correlation with d-dimer level and a lower   -- extremity ultrasound is recommended. Right lower lobe and left lower lobe pulmonary nodules.    -- These nodules measure 7 and 7.7 mm respectively.  There does appear to be very mild   -- emphysematous disease in the lung apices.  In a high risk patient, initial followup   -- CT in 3-6 months is recommended an 18-24 months. Alternatively, if prior imaging is   -- available, it can be submitted for comparison purposes.    Treatment and Evaluation  -- The EKG findings today were without concerning findings from baseline   -- Chest x-ray showed no infiltrate and showed no acute pathology   -- The US of the lower extremity performed in the ER today was negative for DVT    -- The electrolytes drawn in the ER today were within normal limits   -- The CBC drawn in the ER today was within normal limits   -- The cardiac enzymes were within normal limits   -- The 2nd troponin drawn in the ER today was within normal limits    -- The BNP drawn in the ER today were within normal limits   -- The PT, PTT, and INR  were within normal limits.      Medications Given  -- aspirin chewable tablet 81 mg (81 mg Oral Given 3/21/17 4153)   -- omnipaque 350 iohexol 75 mL (75 mLs Intravenous Given 3/21/17 1052)   -- enoxaparin injection 80 mg (80 mg Subcutaneous Given 3/21/17 1145)     ED Management  -- Patient with an abnormal CT study PE today, pseudo-defect versus PE  -- [facility] consult, will start the patient on blood thinners now  --  Will follow up with [facility] this week     Diagnosis  -- The encounter diagnosis was Chest pain, non-cardiac.    Disposition and Plan  -- Disposition: home  -- Condition: stable  -- Follow-up: Patient to follow up with cardiology in 1-2 days.  -- I advised the patient that we have found no life threatening condition today  -- At this time, I believe the patient is clinically stable for discharge.   -- The patient acknowledges that close follow up with a MD is required   -- Patient agrees to comply with all instruction and direction given in the ER    This note is dictated on Dragon Natural Speaking word recognition program.  There are word recognition mistakes that are occasionally missed on review.           Michael Cardona MD  03/21/17 7024

## 2017-03-21 NOTE — DISCHARGE INSTRUCTIONS
Noncardiac Chest Pain    Based on your visit today, the health care provider doesnt know what is causing your chest pain. In most cases, people who come to the emergency department with chest pain dont have a problem with their heart. Instead, the pain is caused by other conditions. These may be problems with the lungs, muscles, bones, digestive tract, nerves, or mental health.  Lung problems  · Inflammation around the lungs (pleurisy)  · Collapsed lung (pneumothorax)  · Fluid around the lungs (pleural effusion)  · Lung cancer. This is a rare cause of chest pain.  Muscle or bone problems  · Inflamed cartilage between the ribs (pleurisy)  · Fibromyalgia  · Rheumatoid arthritis  Digestive system problems  · Reflux  · Stomach ulcer  · Spasms of the esophagus  · Gall stones  · Gallbladder inflammation  Mental health conditions  · Panic or anxiety attacks  · Emotional distress  Your condition doesnt seem serious and your pain doesnt appear to be coming from your heart. But sometimes the signs of a serious problem take more time to appear. Watch for the warning signs listed below.  Home care  Follow these guidelines when caring for yourself at home:  · Rest today and avoid strenuous activity.  · Take any prescribed medicine as directed.  Follow-up care  Follow up with your health care provider, or as advised, if you dont start to feel better within 24 hours.  When to seek medical advice  Call your health care provider right away if any of these occur:  · A change in the type of pain. Call if it feels different, becomes more serious, lasts longer, or begins to spread into your shoulder, arm, neck, jaw, or back.  · Shortness of breath  · You feel more pain when you breathe  · Cough with dark-colored mucus or blood  · Weakness, dizziness, or fainting  · Fever of 100.4ºF (38ºC) or higher, or as directed by your health care provider  · Swelling, pain, or redness in one leg  Date Last Reviewed: 11/24/2014  © 4726-9940  [employer]. [address] [place] [address], [place] [address]. All rights reserved. This information is not intended as a substitute for professional medical care. Always follow your healthcare professional's instructions.

## 2017-03-21 NOTE — ED TRIAGE NOTES
Patient comes in today complaining of nausea, dizziness, arms going numb and chest pressure.  This has been going on for about 3 days.  Patient has recently had knee surgery.

## 2017-03-22 ENCOUNTER — HOSPITAL ENCOUNTER (EMERGENCY)
Facility: HOSPITAL | Age: 47
Discharge: HOME | End: 2017-03-22
Attending: SURGERY
Payer: MEDICAID

## 2017-03-22 VITALS
DIASTOLIC BLOOD PRESSURE: 70 MMHG | HEART RATE: 68 BPM | TEMPERATURE: 97 F | BODY MASS INDEX: 25.99 KG/M2 | SYSTOLIC BLOOD PRESSURE: 134 MMHG | RESPIRATION RATE: 16 BRPM | WEIGHT: 176 LBS | OXYGEN SATURATION: 99 %

## 2017-03-22 DIAGNOSIS — R07.89 NON-CARDIAC CHEST PAIN: Primary | ICD-10-CM

## 2017-03-22 LAB
ALBUMIN SERPL BCP-MCNC: 4 G/DL
ALP SERPL-CCNC: 91 U/L
ALT SERPL W/O P-5'-P-CCNC: 28 U/L
ANION GAP SERPL CALC-SCNC: 8 MMOL/L
APTT BLDCRRT: 25.6 SEC
AST SERPL-CCNC: 19 U/L
BASOPHILS # BLD AUTO: 0.01 K/UL
BASOPHILS NFR BLD: 0.2 %
BILIRUB SERPL-MCNC: 1 MG/DL
BNP SERPL-MCNC: 17 PG/ML
BUN SERPL-MCNC: 16 MG/DL
CALCIUM SERPL-MCNC: 9.1 MG/DL
CHLORIDE SERPL-SCNC: 105 MMOL/L
CK MB SERPL-MCNC: 0.6 NG/ML
CK MB SERPL-RTO: 1 %
CK SERPL-CCNC: 59 U/L
CK SERPL-CCNC: 59 U/L
CO2 SERPL-SCNC: 28 MMOL/L
CREAT SERPL-MCNC: 0.9 MG/DL
DIFFERENTIAL METHOD: ABNORMAL
EOSINOPHIL # BLD AUTO: 0 K/UL
EOSINOPHIL NFR BLD: 0.7 %
ERYTHROCYTE [DISTWIDTH] IN BLOOD BY AUTOMATED COUNT: 12.8 %
EST. GFR  (AFRICAN AMERICAN): >60 ML/MIN/1.73 M^2
EST. GFR  (NON AFRICAN AMERICAN): >60 ML/MIN/1.73 M^2
GLUCOSE SERPL-MCNC: 111 MG/DL
HCT VFR BLD AUTO: 41 %
HGB BLD-MCNC: 14 G/DL
INR PPP: 1
LYMPHOCYTES # BLD AUTO: 1.1 K/UL
LYMPHOCYTES NFR BLD: 20.3 %
MCH RBC QN AUTO: 30.6 PG
MCHC RBC AUTO-ENTMCNC: 34.1 %
MCV RBC AUTO: 90 FL
MONOCYTES # BLD AUTO: 0.4 K/UL
MONOCYTES NFR BLD: 7.3 %
NEUTROPHILS # BLD AUTO: 3.9 K/UL
NEUTROPHILS NFR BLD: 71.5 %
PLATELET # BLD AUTO: 220 K/UL
PMV BLD AUTO: 9.5 FL
POTASSIUM SERPL-SCNC: 3.9 MMOL/L
PROT SERPL-MCNC: 7.5 G/DL
PROTHROMBIN TIME: 9.9 SEC
RBC # BLD AUTO: 4.57 M/UL
SODIUM SERPL-SCNC: 141 MMOL/L
TROPONIN I SERPL DL<=0.01 NG/ML-MCNC: <0.006 NG/ML
TROPONIN I SERPL DL<=0.01 NG/ML-MCNC: <0.006 NG/ML
WBC # BLD AUTO: 5.47 K/UL

## 2017-03-22 PROCEDURE — 25000003 PHARM REV CODE 250: Performed by: SURGERY

## 2017-03-22 PROCEDURE — 80053 COMPREHEN METABOLIC PANEL: CPT

## 2017-03-22 PROCEDURE — 85025 COMPLETE CBC W/AUTO DIFF WBC: CPT

## 2017-03-22 PROCEDURE — 36415 COLL VENOUS BLD VENIPUNCTURE: CPT

## 2017-03-22 PROCEDURE — 93005 ELECTROCARDIOGRAM TRACING: CPT

## 2017-03-22 PROCEDURE — 93010 ELECTROCARDIOGRAM REPORT: CPT | Mod: ,,, | Performed by: INTERNAL MEDICINE

## 2017-03-22 PROCEDURE — 99284 EMERGENCY DEPT VISIT MOD MDM: CPT

## 2017-03-22 PROCEDURE — 82553 CREATINE MB FRACTION: CPT

## 2017-03-22 PROCEDURE — 84484 ASSAY OF TROPONIN QUANT: CPT | Mod: 91

## 2017-03-22 PROCEDURE — 85730 THROMBOPLASTIN TIME PARTIAL: CPT

## 2017-03-22 PROCEDURE — 85610 PROTHROMBIN TIME: CPT

## 2017-03-22 PROCEDURE — 83880 ASSAY OF NATRIURETIC PEPTIDE: CPT

## 2017-03-22 RX ORDER — NAPROXEN SODIUM 220 MG/1
81 TABLET, FILM COATED ORAL
Status: COMPLETED | OUTPATIENT
Start: 2017-03-22 | End: 2017-03-22

## 2017-03-22 RX ADMIN — ASPIRIN 81 MG: 81 TABLET, CHEWABLE ORAL at 01:03

## 2017-03-22 NOTE — CONSULTS
[facility]  Cardiology  Consult Note    Patient Name: Jordan Prater  MRN: [id number]  Admission Date: 3/22/2017  Hospital Length of Stay: 0 days  Code Status: No Order   Attending Provider:   Consulting Provider: Hardeep Nunez NP  Primary Care Physician: Anahy Marino MD  Principal Problem:<principal problem not specified>          Inpatient consult to [facility]  Consult performed by: CHINO MARTIN  Consult ordered by: MEDINA HERNÁNDEZ        Subjective:     Chief Complaint:  Left sided chest pain    HPI: 47 y/o male with PMHx of bipolar disorder presents to the ED with c/o left sided chest pain into his axilla area and under his left breast which is worse with deep inspiration. He also presents with c/o KAUFMAN with moderate exertion. EKG with no signs of ischemia, Osbaldo negative. Pt has multiple recent ED visits with similar complaints and was found to have a probable PE yesterday and was placed on Eliquis.     Past Medical History:   Diagnosis Date    ACL tear     Bipolar 1 disorder     Tobacco abuse        Past Surgical History:   Procedure Laterality Date    ANTERIOR CRUCIATE LIGAMENT REPAIR Left 10/14/2016    APPENDECTOMY      HERNIA REPAIR      TONSILLECTOMY         Review of patient's allergies indicates:   Allergen Reactions    Chlorhexidine towelette Itching    Tramadol Itching    Codeine Other (See Comments)     delusional       No current facility-administered medications on file prior to encounter.      Current Outpatient Prescriptions on File Prior to Encounter   Medication Sig    apixaban 5 mg Tab Take 2 tablets (10 mg total) by mouth 2 (two) times daily.    escitalopram oxalate (LEXAPRO) 20 MG tablet Take 10 mg by mouth once daily.    hydrocodone-acetaminophen 5-325mg (NORCO) 5-325 mg per tablet Take 1 tablet by mouth every 6 (six) hours as needed for Pain.    ibuprofen (ADVIL,MOTRIN) 800 MG tablet Take 800 mg by mouth every 6 (six) hours as needed for Pain.     oxycodone-acetaminophen (PERCOCET)  mg per tablet Take 1 tablet by mouth every 4 (four) hours as needed for Pain.    pantoprazole (PROTONIX) 40 MG tablet Take 1 tablet (40 mg total) by mouth once daily.     Family History     Problem Relation (Age of Onset)    Heart attack Father    No Known Problems Mother, Maternal Grandmother, Maternal Grandfather, Paternal Grandmother, Paternal Grandfather        Social History Main Topics    Smoking status: Former Smoker     Years: 25.00    Smokeless tobacco: Current User     Types: Chew    Alcohol use Yes      Comment: occ    Drug use: No    Sexual activity: Yes     Partners: Female     ROS     Constitutional : Negative  EENT : Negative  CV : Atypical CP  Respiratory : KAUFMAN  Gastrointestinal: Negative   Genitourinary: Negative  Musculoskeletal: Negative  Skin : Negative  Neurological : AAO x 3     Objective:     Vital Signs (Most Recent):  Temp: 97 °F (36.1 °C) (03/22/17 1326)  Pulse: 66 (03/22/17 1326)  Resp: 16 (03/22/17 1326)  BP: 112/70 (03/22/17 1326)  SpO2: 100 % (03/22/17 1326) Vital Signs (24h Range):  Temp:  [97 °F (36.1 °C)] 97 °F (36.1 °C)  Pulse:  [66] 66  Resp:  [16] 16  SpO2:  [100 %] 100 %  BP: (112)/(70) 112/70     Weight: 79.8 kg (176 lb)  Body mass index is 25.99 kg/(m^2).    SpO2: 100 %  O2 Device (Oxygen Therapy): room air    No intake or output data in the 24 hours ending 03/22/17 1513    Lines/Drains/Airways          No matching active lines, drains, or airways          Physical Exam    Significant Labs:   BMP:   Recent Labs  Lab 03/21/17  0953 03/22/17  1330   GLU 90 111*    141   K 4.1 3.9    105   CO2 28 28   BUN 18 16   CREATININE 0.9 0.9   CALCIUM 9.4 9.1   , CMP   Recent Labs  Lab 03/21/17  0953 03/22/17  1330    141   K 4.1 3.9    105   CO2 28 28   GLU 90 111*   BUN 18 16   CREATININE 0.9 0.9   CALCIUM 9.4 9.1   PROT 7.8 7.5   ALBUMIN 4.1 4.0   BILITOT 0.9 1.0   ALKPHOS 91 91   AST 21 19   ALT 29 28   ANIONGAP  11 8   ESTGFRAFRICA >60 >60   EGFRNONAA >60 >60   , CBC   Recent Labs  Lab 03/21/17  0953 03/22/17  1330   WBC 6.42 5.47   HGB 14.1 14.0   HCT 41.4 41.0    220   , Lipid Panel No results for input(s): CHOL, HDL, LDLCALC, TRIG, CHOLHDL in the last 48 hours. and Troponin   Recent Labs  Lab 03/21/17  0953 03/21/17  1142 03/22/17  1330   TROPONINI <0.006 <0.006 <0.006         Assessment and Plan:     There are no hospital problems to display for this patient.      VTE Risk Mitigation     None        Atypical chest pain and Dyspnea on exertion  CT suggestive of PE  S/p L knee surgery 10/16 and again 2 wks ago  Normal ETT 2/16 LVEF 60% PAS 13, ca score 0  Bipolar disease Hx      Hardeep Nunez, YUDELKA  Cardiology   [facility]    PLAN:  Pt presents again with similar symptoms of atypical chest pain and mild KAUFMAN.  Recent CT scan with findings of probable PE treated with NOAC.  Osbaldo negative over last 24 hours. EKG with no acute signs of ischemia or change.  Chest pain atypical in nature for cardiac etiology.  R/O anxiety and other non cardiac cause of chest pain.  Pt is now scheduled for cardiac nuclear perfusion scan within the next 48 hours.

## 2017-03-22 NOTE — ED AVS SNAPSHOT
[facility]  [address] [place] [address] One  [place] [address]               Jordan Prater   3/22/2017  1:17 PM   ED    Description:  Male : 1970   Department:  [facility]           Your Care was Coordinated By:     Provider Role From To    Michael Cardona MD Attending Provider 17 1315 --      Reason for Visit     Shortness of Breath           Diagnoses this Visit        Comments    Non-cardiac chest pain    -  Primary       ED Disposition     ED Disposition Condition Comment    Discharge             To Do List           Follow-up Information     Follow up with Brian Ge MD. Schedule an appointment as soon as possible for a visit in 2 days.    Specialty:  Cardiology    Why:   2017: 1:45 PM appointment, nothing but water triage before the visit.  No caffeine for 24 hours    Contact information:    [address] [place] [address]   [place] [address]  [phone]        [facility]     [facility] -  Assistance  Registered nurses in the [facility] provide clinical advisement, health education, appointment booking, and other advisory services.  Call for this free service at [phone].             Medications           Message regarding Medications     Verify the changes and/or additions to your medication regime listed below are the same as discussed with your clinician today.  If any of these changes or additions are incorrect, please notify your healthcare provider.        These medications were administered today        Dose Freq    aspirin chewable tablet 81 mg 81 mg ED 1 Time    Sig: Take 1 tablet (81 mg total) by mouth ED 1 Time.    Class: Normal    Route: Oral           Verify that the below list of medications is an accurate representation of the medications you are currently taking.  If none reported, the list may be blank. If incorrect, please contact your healthcare provider. Carry this list with  you in case of emergency.           Current Medications     apixaban 5 mg Tab Take 2 tablets (10 mg total) by mouth 2 (two) times daily.    escitalopram oxalate (LEXAPRO) 20 MG tablet Take 10 mg by mouth once daily.    hydrocodone-acetaminophen 5-325mg (NORCO) 5-325 mg per tablet Take 1 tablet by mouth every 6 (six) hours as needed for Pain.    ibuprofen (ADVIL,MOTRIN) 800 MG tablet Take 800 mg by mouth every 6 (six) hours as needed for Pain.    oxycodone-acetaminophen (PERCOCET)  mg per tablet Take 1 tablet by mouth every 4 (four) hours as needed for Pain.    pantoprazole (PROTONIX) 40 MG tablet Take 1 tablet (40 mg total) by mouth once daily.           Clinical Reference Information           Your Vitals Were     BP Pulse Temp Resp Weight SpO2    112/70 (BP Location: Right arm, Patient Position: Lying) 66 97 °F (36.1 °C) (Oral) 16 79.8 kg (176 lb) 100%    BMI                25.99 kg/m2          Allergies as of 3/22/2017        Reactions    Chlorhexidine Towelette Itching    Tramadol Itching    Codeine Other (See Comments)    delusional      Immunizations Administered on Date of Encounter - 3/22/2017     None      ED Micro, Lab, POCT     Start Ordered       Status Ordering Provider    03/22/17 1448 03/22/17 1447  Troponin I  STAT      Final result     03/22/17 1318 03/22/17 1317  Comprehensive metabolic panel  STAT      Final result     03/22/17 1318 03/22/17 1317  CBC auto differential  STAT      Final result     03/22/17 1318 03/22/17 1317  Troponin I  Now then every 6 hours     Start Status   03/22/17 1318 Final result   03/22/17 1918 Scheduled   03/23/17 0118 Scheduled   03/23/17 0718 Scheduled   03/23/17 1318 Scheduled   03/23/17 1918 Scheduled   03/24/17 0118 Scheduled   03/24/17 0718 Scheduled   03/24/17 1318 Scheduled   03/24/17 1918 Scheduled   03/25/17 0118 Scheduled   03/25/17 0718 Scheduled   03/25/17 1318 Scheduled   03/25/17 1918 Scheduled       Acknowledged     03/22/17 1318 03/22/17 1317  CK   STAT      Final result     03/22/17 1318 03/22/17 1317  CK-MB  STAT      Final result     03/22/17 1318 03/22/17 1317  Brain natriuretic peptide  STAT      Final result     03/22/17 1318 03/22/17 1317  Protime-INR  STAT      Final result     03/22/17 1318 03/22/17 1317  APTT  STAT      Final result       ED Imaging Orders     Start Ordered       Status Ordering Provider    03/22/17 1318 03/22/17 1317  X-Ray Chest 1 View  1 time imaging      Final result         Discharge Instructions         Noncardiac Chest Pain    Based on your visit today, the health care provider doesnt know what is causing your chest pain. In most cases, people who come to the emergency department with chest pain dont have a problem with their heart. Instead, the pain is caused by other conditions. These may be problems with the lungs, muscles, bones, digestive tract, nerves, or mental health.  Lung problems  · Inflammation around the lungs (pleurisy)  · Collapsed lung (pneumothorax)  · Fluid around the lungs (pleural effusion)  · Lung cancer. This is a rare cause of chest pain.  Muscle or bone problems  · Inflamed cartilage between the ribs (pleurisy)  · Fibromyalgia  · Rheumatoid arthritis  Digestive system problems  · Reflux  · Stomach ulcer  · Spasms of the esophagus  · Gall stones  · Gallbladder inflammation  Mental health conditions  · Panic or anxiety attacks  · Emotional distress  Your condition doesnt seem serious and your pain doesnt appear to be coming from your heart. But sometimes the signs of a serious problem take more time to appear. Watch for the warning signs listed below.  Home care  Follow these guidelines when caring for yourself at home:  · Rest today and avoid strenuous activity.  · Take any prescribed medicine as directed.  Follow-up care  Follow up with your health care provider, or as advised, if you dont start to feel better within 24 hours.  When to seek medical advice  Call your health care provider right away  if any of these occur:  · A change in the type of pain. Call if it feels different, becomes more serious, lasts longer, or begins to spread into your shoulder, arm, neck, jaw, or back.  · Shortness of breath  · You feel more pain when you breathe  · Cough with dark-colored mucus or blood  · Weakness, dizziness, or fainting  · Fever of 100.4ºF (38ºC) or higher, or as directed by your health care provider  · Swelling, pain, or redness in one leg  Date Last Reviewed: 11/24/2014  © 4661-7729 [employer]. [address] [place] [address]. All rights reserved. This information is not intended as a substitute for professional medical care. Always follow your healthcare professional's instructions.          Your Scheduled Appointments     Apr 17, 2017  9:45 AM CDT   Established Patient Visit with ORTHO DEISI POWERS - Orthopedics ([facility])    [address] [place] [id number] [phone]              Smoking Cessation     If you would like to quit smoking:   You may be eligible for free services if you are a [place] resident and started smoking cigarettes before September 1, 1988.  Call the Smoking Cessation Trust ([facility]) toll free at (559) 458-9981 or (797) 318-0087.   Call 1-800-QUIT-NOW if you do not meet the above criteria.             [facility] complies with applicable Federal civil rights laws and does not discriminate on the basis of race, color, national origin, age, disability, or sex.        Language Assistance Services     ATTENTION: Language assistance services are available, free of charge. Please call [phone].      ATENCIÓN: Si habla español, tiene a moran disposición servicios gratuitos de asistencia lingüística. Llame al [phone].     CHÚ Ý: N?u b?n nói Ti?ng Vi?t, có các d?ch v? h? tr? ngôn ng? mi?n phí dành cho b?n. G?i s? [phone].

## 2017-03-22 NOTE — DISCHARGE INSTRUCTIONS
Noncardiac Chest Pain    Based on your visit today, the health care provider doesnt know what is causing your chest pain. In most cases, people who come to the emergency department with chest pain dont have a problem with their heart. Instead, the pain is caused by other conditions. These may be problems with the lungs, muscles, bones, digestive tract, nerves, or mental health.  Lung problems  · Inflammation around the lungs (pleurisy)  · Collapsed lung (pneumothorax)  · Fluid around the lungs (pleural effusion)  · Lung cancer. This is a rare cause of chest pain.  Muscle or bone problems  · Inflamed cartilage between the ribs (pleurisy)  · Fibromyalgia  · Rheumatoid arthritis  Digestive system problems  · Reflux  · Stomach ulcer  · Spasms of the esophagus  · Gall stones  · Gallbladder inflammation  Mental health conditions  · Panic or anxiety attacks  · Emotional distress  Your condition doesnt seem serious and your pain doesnt appear to be coming from your heart. But sometimes the signs of a serious problem take more time to appear. Watch for the warning signs listed below.  Home care  Follow these guidelines when caring for yourself at home:  · Rest today and avoid strenuous activity.  · Take any prescribed medicine as directed.  Follow-up care  Follow up with your health care provider, or as advised, if you dont start to feel better within 24 hours.  When to seek medical advice  Call your health care provider right away if any of these occur:  · A change in the type of pain. Call if it feels different, becomes more serious, lasts longer, or begins to spread into your shoulder, arm, neck, jaw, or back.  · Shortness of breath  · You feel more pain when you breathe  · Cough with dark-colored mucus or blood  · Weakness, dizziness, or fainting  · Fever of 100.4ºF (38ºC) or higher, or as directed by your health care provider  · Swelling, pain, or redness in one leg  Date Last Reviewed: 11/24/2014  © 7170-2304  [employer]. [address] [place] [address], [place] [address]. All rights reserved. This information is not intended as a substitute for professional medical care. Always follow your healthcare professional's instructions.

## 2017-03-22 NOTE — ED PROVIDER NOTES
[facility]                                        March 22, 2017                   Chief Complaint  46 y.o. male with Shortness of Breath    History of Present Illness  Jordan Prater presents to the emergency room with shortness breath today  Patient also states he has chest pain, normal sinus rhythm on EKG today in the ER  The patient was seen in the emergency room yesterday with a negative EKG then  Patient had a questionable PE noted on CT, started on Xarelto by ANNA SCHULTZ here   Patient states he had left-sided chest wall pain today, afebrile and VSS in the ER  Patient states he is having a lot of stress issues, out of work for the last year so  Patient has a completely negative workup with negative troponins today in the ER  I reconsulted cardiology, agree with negative cardiac workup: Stable at this time    The history is provided by the patient  Medical history: Anterior cruciate ligament tear, bipolar, tobacco issues  Surgical history: Anterior cruciate ligament repair, appendectomy, hernia repair, tonsillectomy  ALLERGIES: Chlorhexidine, Ultram, codeine    Review of Systems and Physical Exam     Review of Systems  -- Constitution - no fever, denies fatigue, no weakness, no chills  -- Eyes - no tearing or redness, no visual disturbance  -- Ear, Nose - no tinnitus or earache, no nasal congestion or discharge  -- Mouth,Throat - no sore throat, no toothache, normal voice, normal swallowing  -- Respiratory - denies cough and congestion, no shortness of breath, no KAUFMAN  -- Cardiovascular - chest pain, no palpitations, denies claudication  -- Gastrointestinal - denies abdominal pain, nausea, vomiting, or diarrhea  -- Musculoskeletal - denies back pain, negative for myalgias and arthralgias   -- Neurological - no headache, denies weakness or seizure; no LOC  -- Skin - denies pallor, rash, or changes in skin. no hives or welts noted    Vital Signs  -- His blood pressure is 112/70 and his pulse is  66.   -- His respiration is 16 and oxygen saturation is 100%.      Physical Exam  -- Nursing note and vitals reviewed  -- Constitutional: Appears well-developed and well-nourished  -- Head: Atraumatic. Normocephalic. No obvious abnormality  -- Eyes: Pupils are equal and reactive to light. Normal conjunctiva and lids  -- Cardiac: Normal rate, regular rhythm and normal heart sounds  -- Pulmonary: Normal respiratory effort, breath sounds clear to auscultation  -- Abdominal: Soft, no tenderness. Normal bowel sounds. Normal liver edge  -- Musculoskeletal: Normal range of motion, no effusions. Joints stable   -- Neurological: No focal deficits. Showed good interaction with staff  -- Vascular: Posterior tibial, dorsalis pedis and radial pulses 2+ bilaterally      Emergency Room Course     Treatment and Evaluation  -- The EKG findings today were without concerning findings from baseline   -- Chest x-ray showed no infiltrate and showed no acute pathology   -- The electrolytes drawn in the ER today were within normal limits   -- The CBC drawn in the ER today was within normal limits   -- The cardiac enzymes were within normal limits   -- The 2nd troponin drawn in the ER today was within normal limits    -- The BNP drawn in the ER today were within normal limits   -- The PT, PTT, and INR were within normal limits.    -- [facility] cardiology consult in the ER  -- Please see the [facility] cardiology consult note for full details  -- Patient will follow-up on Friday with a stress test at the office  -- 81 mg by mouth aspirin given in the ER     Diagnosis  -- The encounter diagnosis was Non-cardiac chest pain.    Disposition and Plan  -- Disposition: home  -- Condition: stable  -- Follow-up: Patient to follow up with cardiology on Friday 1:45 PM  -- I advised the patient that we have found no life threatening condition today  -- At this time, I believe the patient is clinically stable for discharge.   -- The patient acknowledges that close  follow up with a MD is required   -- Patient agrees to comply with all instruction and direction given in the ER    This note is dictated on Dragon Natural Speaking word recognition program.  There are word recognition mistakes that are occasionally missed on review.           Michael Cardona MD  03/22/17 1543

## 2017-06-20 ENCOUNTER — LAB VISIT (OUTPATIENT)
Dept: LAB | Facility: HOSPITAL | Age: 47
End: 2017-06-20
Attending: NURSE PRACTITIONER
Payer: MEDICAID

## 2017-06-20 ENCOUNTER — OFFICE VISIT (OUTPATIENT)
Dept: INTERNAL MEDICINE | Facility: CLINIC | Age: 47
End: 2017-06-20
Payer: MEDICAID

## 2017-06-20 VITALS
HEIGHT: 69 IN | BODY MASS INDEX: 24.14 KG/M2 | WEIGHT: 163 LBS | DIASTOLIC BLOOD PRESSURE: 70 MMHG | SYSTOLIC BLOOD PRESSURE: 130 MMHG | HEART RATE: 78 BPM | OXYGEN SATURATION: 98 %

## 2017-06-20 DIAGNOSIS — R79.89 LOW TESTOSTERONE: ICD-10-CM

## 2017-06-20 DIAGNOSIS — R06.81 WITNESSED EPISODE OF APNEA: ICD-10-CM

## 2017-06-20 DIAGNOSIS — R53.83 FATIGUE, UNSPECIFIED TYPE: Primary | ICD-10-CM

## 2017-06-20 DIAGNOSIS — Z98.890 S/P KNEE SURGERY: ICD-10-CM

## 2017-06-20 DIAGNOSIS — R06.83 SNORES: ICD-10-CM

## 2017-06-20 DIAGNOSIS — R53.83 FATIGUE, UNSPECIFIED TYPE: ICD-10-CM

## 2017-06-20 DIAGNOSIS — F31.77 BIPOLAR DISORDER, IN PARTIAL REMISSION, MOST RECENT EPISODE MIXED: ICD-10-CM

## 2017-06-20 LAB
25(OH)D3+25(OH)D2 SERPL-MCNC: 41 NG/ML
ALBUMIN SERPL BCP-MCNC: 4.1 G/DL
ALP SERPL-CCNC: 102 U/L
ALT SERPL W/O P-5'-P-CCNC: 32 U/L
ANION GAP SERPL CALC-SCNC: 9 MMOL/L
AST SERPL-CCNC: 28 U/L
BILIRUB SERPL-MCNC: 0.7 MG/DL
BUN SERPL-MCNC: 13 MG/DL
CALCIUM SERPL-MCNC: 9 MG/DL
CHLORIDE SERPL-SCNC: 105 MMOL/L
CO2 SERPL-SCNC: 28 MMOL/L
COMPLEXED PSA SERPL-MCNC: 1.2 NG/ML
CREAT SERPL-MCNC: 1.1 MG/DL
EST. GFR  (AFRICAN AMERICAN): >60 ML/MIN/1.73 M^2
EST. GFR  (NON AFRICAN AMERICAN): >60 ML/MIN/1.73 M^2
FSH SERPL-ACNC: 5.1 MIU/ML
GLUCOSE SERPL-MCNC: 98 MG/DL
LH SERPL-ACNC: 5.5 MIU/ML
POTASSIUM SERPL-SCNC: 4 MMOL/L
PROLACTIN SERPL IA-MCNC: 6.4 NG/ML
PROT SERPL-MCNC: 7.8 G/DL
SODIUM SERPL-SCNC: 142 MMOL/L
T4 SERPL-MCNC: 5.2 UG/DL
VIT B12 SERPL-MCNC: 850 PG/ML

## 2017-06-20 PROCEDURE — 99999 PR PBB SHADOW E&M-EST. PATIENT-LVL III: CPT | Mod: PBBFAC,,, | Performed by: NURSE PRACTITIONER

## 2017-06-20 PROCEDURE — 80053 COMPREHEN METABOLIC PANEL: CPT

## 2017-06-20 PROCEDURE — 99204 OFFICE O/P NEW MOD 45 MIN: CPT | Mod: S$PBB,,, | Performed by: NURSE PRACTITIONER

## 2017-06-20 PROCEDURE — 84436 ASSAY OF TOTAL THYROXINE: CPT

## 2017-06-20 PROCEDURE — 84480 ASSAY TRIIODOTHYRONINE (T3): CPT

## 2017-06-20 PROCEDURE — 84146 ASSAY OF PROLACTIN: CPT

## 2017-06-20 PROCEDURE — 99213 OFFICE O/P EST LOW 20 MIN: CPT | Mod: PBBFAC,PN | Performed by: NURSE PRACTITIONER

## 2017-06-20 PROCEDURE — 84153 ASSAY OF PSA TOTAL: CPT

## 2017-06-20 PROCEDURE — 36415 COLL VENOUS BLD VENIPUNCTURE: CPT

## 2017-06-20 PROCEDURE — 82607 VITAMIN B-12: CPT

## 2017-06-20 PROCEDURE — 83001 ASSAY OF GONADOTROPIN (FSH): CPT

## 2017-06-20 PROCEDURE — 82306 VITAMIN D 25 HYDROXY: CPT

## 2017-06-20 PROCEDURE — 83002 ASSAY OF GONADOTROPIN (LH): CPT

## 2017-06-20 RX ORDER — ARIPIPRAZOLE 5 MG/1
5 TABLET ORAL DAILY
Qty: 30 TABLET | Refills: 2 | Status: SHIPPED | OUTPATIENT
Start: 2017-06-20 | End: 2017-06-22

## 2017-06-20 NOTE — PATIENT INSTRUCTIONS
What Are Snoring and Obstructive Sleep Apnea?  If youve ever had a stuffed-up nose, you know the feeling of trying to breathe through a very narrow passageway. This is what happens in your throat when you snore. While you sleep, structures in your throat partially block your air passage, making the passage narrow and hard to breathe through. If the entire passage becomes blocked and you cant breathe at all, you have sleep apnea.     Air moves freely through the nose, mouth and throat.   Snoring  If your throat structures are too large or the muscles relax too much during sleep, the air passage may be partially blocked. As air from the nose or mouth passes around this blockage, the throat structures vibrate, causing the familiar sound of snoring. At times, this sound can be so loud that snorers wake up others, or even themselves, during the night. Snoring gets worse as more and more of the air passage is blocked.     Air is blocked in the back of the mouth and throat.   Obstructive sleep apnea  If the structures completely block the throat, air cant flow to the lungs at all. This is called apnea (meaning no breathing). Since the lungs arent getting fresh air, the brain tells the body to wake up just enough to tighten the muscles and unblock the air passage. With a loud gasp, breathing begins again. This process may be repeated over and over again throughout the night, making your sleep fragmented with a lighter stage of sleep. Even though you do not remember waking up many times during the night to a lighter sleep, you feel tired the next day. The lack of sleep and fresh air can also strain your lungs, heart, and other organs, leading to problems such as high blood pressure, heart attack, or stroke.     Air may not be able to move freely past a deviated septum or swollen turbinates.   Problems in the nose and jaw  Problems in the structure of the nose may obstruct breathing. A crooked (deviated) septum or  swollen turbinates can make snoring worse or lead to apnea. Also, a receding jaw may make the tongue sit too far back, so its more likely to block the airway when youre asleep.        Date Last Reviewed: 7/18/2015  © 9859-8016 [employer]. [address] [place] [address], [place] [address]. All rights reserved. This information is not intended as a substitute for professional medical care. Always follow your healthcare professional's instructions.        Tips to Help Prevent Snoring  Your snoring may get better if you make a few simple changes in your sleeping and waking habits. These changes might be all you need to improve or even cure your snoring, or they may work best when used along with other types of treatment.    Sleep on your side  Sleeping on your side may keep throat tissue from blocking your air passage. This may improve or even cure snoring. But it can be hard to stop sleeping on your back. Try sewing a pocket or sock onto the back of a T-shirt or pajama top. Put a few tennis balls or a bag of unshelled nuts into this pocket or sock, then wear the shirt to bed. This will help keep you from rolling onto your back. If this doesn't work, try wearing a backpack full of foam pieces, or put a wedge-shaped pillow behind you. You can [place] purchase devices online.  Avoid alcohol and certain medicines  Alcohol and medicines such as sedatives, sleeping pills, and antihistamines make breathing slower and more shallow. They also make your muscles relax, so structures in your throat can block your air passage. These changes can cause or worsen snoring. If you snore, avoid alcohol. Talk to your healthcare provider if you take medicines to help you sleep.  Lose weight  Too much weight can make snoring worse. Extra weight puts pressure on your neck tissues and lungs, making breathing harder. If you're overweight, ask your healthcare provider about a weight-loss program.  Exercise regularly  Exercise can help  you lose weight, tone your muscles, and make your lungs work better. These changes may help improve your snoring. Ask your healthcare provider about an exercise program like walking, or something else that you enjoy.  Unblock your nose  If something blocks your nose, treating the problem may help improve snoring. Your healthcare provider can suggest medications for allergies or sinus problems. Nasal saline rinses can also open up the nasal passages.Nasal strips applied on the bridge of the nose can aid breathing. Surgery can straighten a deviated septum, reduce the size of the turbinates, or remove polyps (growths). If you smoke, try to quit because smoking makes a stuffy nose worse.  Understanding the risks of sleep apnea  In some cases, snoring is not physically harmful, but it can be associated with a more serious condition called sleep apnea. Some common symptoms of sleep apnea include:  · Loud, frequent snoring  · Heavy daytime drowsiness  · Difficulty breathing during sleep  · Headaches upon awakening  If you are concerned that you might have sleep apnea, talk with your healthcare provider about tests and treatments that may help.   Date Last Reviewed: 8/9/2015 © 2000-2016 [employer]. [address] [place] [address]. All rights reserved. This information is not intended as a substitute for professional medical care. Always follow your healthcare professional's instructions.        Total Testosterone  Does this test have other names?  Testosterone (total), serum testosterone  What is this test?  This test measures the level of the hormone testosterone in your blood. Testosterone is a male sex hormone (androgen) that helps male features develop. Testosterone is made in the testes and the adrenal glands. It causes the changes that occur in boys during puberty. Testosterone helps hair and muscles to grow. It also helps the penis and testes to grow. Testosterone also causes a boy's voice  to deepen. Men continue to make testosterone. In adults it boosts sex drive and helps make sperm.  Women's ovaries also make small amounts of testosterone. It helps many organs and body processes in women.  The pituitary gland in your brain regulates the amount of testosterone your body makes.   Most of the testosterone in your blood attaches to two proteins: albumin and sex hormone binding globulin (SHBG). Some testosterone is not attached to proteins, or free. Free testosterone and albumin-bound testosterone are also referred to as bioavailable testosterone. This is the testosterone that is easily used by your body.  If your healthcare provider suspects that you have low or high testosterone, he or she will first test total testosterone levels. This looks at all three parts of testosterone. The free testosterone can help give more information when total testosterone is low.  Both men and women can have health problems because of low or high levels of testosterone. Women with high levels of testosterone may have polycystic ovary syndrome (PCOS). This condition marked by infertility, lack of menstruation, acne, obesity, blood sugar problems, and extra hair growth, especially on the face.  Testosterone levels in men drop as they age, but this is not considered to be hypogonadism. The FDA currently recommends against treating men with low testosterone caused only by aging.  Why do I need this test?  You may need this test if you have symptoms of low testosterone.  Symptoms of low testosterone in men include:  · Large breasts  · Low sex drive or lack of interest in sex  · Difficulty getting an erection  · Low sperm count and other fertility problems  · Changes in the testicles  · Weak bones  · Irritability  · Difficulty concentrating  · Loss of muscle mass  · Hair loss  · Depression  · Fatigue  · Anemia  Symptoms of low testosterone in women include:  · Fertility problems  · Missed or irregular menstrual  periods  · Osteoporosis  · Low sex drive  · Changes in breast tissue  · Vaginal dryness   What other tests might I have along with this test?  Your healthcare provider may order other blood tests to check hormone levels. These include:  · Follicle-stimulating hormone (FSH) test  · Luteinizing hormone (LH) test  · Thyroid stimulating hormone (TSH) test  You may also need to have:  · Biopsy of the testicles  · Imaging test, such as an MRI  · Semen analysis  · Tests of the pituitary gland   What do my test results mean?  Many things may affect your lab test results. These include the method each lab uses to do the test. Even if your test results are different from the normal value, you may not have a problem. To learn what the results mean for you, talk with your healthcare provider.  The results of this test are given in nanograms per deciliter (ng/dL). Normal test results show total testosterone levels of:  · 280 to 1,100 ng/dL for men  · 15 to 70 ng/dL for women  If your testosterone levels are lower than normal, you may have a condition that affects your testosterone production. If your testosterone levels are higher than normal, you may have a tumor on the testes or ovaries that affects your testosterone production.   How is this test done?  The test requires a blood sample, which is drawn through a needle from a vein in your arm. This test is usually done in the morning, because testosterone levels tend to be highest at that time. But you may need to have this test more than once, and at different times of the day, to confirm low testosterone levels. This is because your testosterone level can change from morning to evening and from day to day.  Does this test pose any risks?  Taking a blood sample with a needle carries risks that include bleeding, infection, bruising, or feeling dizzy. When the needle pricks your arm, you may feel a slight stinging sensation or pain. Afterward, the site may be slightly  sore.   What might affect my test results?  Some medicines may affect your test results. These include antifungal medicines such as ketoconazole and hormone medicines. Having the test done late in the day may show that your testosterone level is lower than it really is.  How do I get ready for this test?  You don't need to prepare for this test. But be sure your healthcare provider knows about all medicines, herbs, vitamins, and supplements you are taking. This includes medicines that don't need a prescription and any illicit drugs you may use.   Date Last Reviewed: 10/30/2015  © 4074-6251 [employer]. [address] [place] [address], [place] [address]. All rights reserved. This information is not intended as a substitute for professional medical care. Always follow your healthcare professional's instructions.

## 2017-06-20 NOTE — PROGRESS NOTES
Subjective:       Patient ID: Jordan Prater is a 46 y.o. male.    Chief Complaint: Establish Care    Patient is unknown, to me and presents with   Chief Complaint   Patient presents with    Establish Care   .  Denies chest pain and shortness of breath.  Patient presents new to me for establishment to clinic. Patient is a having a lot of fatigue and also snores and stops breathing. He went to see his pcp in [place] and lives in [place] and wants to be set up here. He was just told he had low T as well. Recently had cardiac work up and was negative. He has been clinically diagnosed with bipolar and was given abilify which worked and would like to get back on it.  Patient also has been laid up for the past few months due to left knee surgery and is scheduled to go back in two months.   HPI  Review of Systems   Constitutional: Positive for fatigue. Negative for activity change, appetite change, chills, diaphoresis, fever and unexpected weight change.   HENT: Negative.  Negative for congestion, ear discharge, ear pain, facial swelling, hearing loss, nosebleeds, postnasal drip, rhinorrhea, sinus pressure, sneezing, sore throat, tinnitus, trouble swallowing and voice change.    Eyes: Negative.  Negative for photophobia, pain, discharge, redness, itching and visual disturbance.   Respiratory: Negative.  Negative for apnea, cough, choking, chest tightness, shortness of breath, wheezing and stridor.    Cardiovascular: Negative.  Negative for chest pain, palpitations and leg swelling.   Gastrointestinal: Negative for abdominal distention, abdominal pain, anal bleeding, blood in stool, constipation, diarrhea, nausea and vomiting.   Genitourinary: Negative.  Negative for difficulty urinating, discharge, dysuria, enuresis, flank pain, frequency, hematuria, penile pain, penile swelling, scrotal swelling, testicular pain and urgency.   Musculoskeletal: Negative.  Negative for arthralgias, back pain, gait problem, joint swelling,  myalgias, neck pain and neck stiffness.   Skin: Negative.  Negative for color change, pallor, rash and wound.   Neurological: Negative for dizziness, tremors, seizures, syncope, facial asymmetry, speech difficulty, weakness, light-headedness, numbness and headaches.   Hematological: Negative for adenopathy. Does not bruise/bleed easily.   Psychiatric/Behavioral: Positive for agitation and sleep disturbance. Negative for suicidal ideas. The patient is nervous/anxious.        Objective:      Physical Exam   Constitutional: He is oriented to person, place, and time. He appears well-developed and well-nourished. No distress.   HENT:   Head: Normocephalic and atraumatic.   Right Ear: External ear normal.   Left Ear: External ear normal.   Nose: Nose normal.   Mouth/Throat: Oropharynx is clear and moist. No oropharyngeal exudate.   Eyes: Conjunctivae and EOM are normal. Pupils are equal, round, and reactive to light. Right eye exhibits no discharge. Left eye exhibits no discharge. No scleral icterus.   Neck: Normal range of motion. No JVD present. No tracheal deviation present. No thyromegaly present.   Cardiovascular: Normal rate, regular rhythm, normal heart sounds and intact distal pulses.  Exam reveals no gallop and no friction rub.    No murmur heard.  Pulmonary/Chest: Effort normal and breath sounds normal. No stridor. No respiratory distress. He has no wheezes. He has no rales. He exhibits no tenderness.   Abdominal: Soft. Bowel sounds are normal. He exhibits no distension and no mass. There is no tenderness. There is no rebound and no guarding.   Musculoskeletal: Normal range of motion. He exhibits no edema or tenderness.   Lymphadenopathy:     He has no cervical adenopathy.   Neurological: He is alert and oriented to person, place, and time. He has normal reflexes. He displays normal reflexes. No cranial nerve deficit. He exhibits normal muscle tone. Coordination normal.   Skin: Skin is warm and dry. Capillary  "refill takes less than 2 seconds. No rash noted. He is not diaphoretic. No erythema. No pallor.   Psychiatric: His behavior is normal. Judgment and thought content normal.   Agitated at times but not combative. Negative suicidal or homicidal ideations    Nursing note and vitals reviewed.      Assessment:       1. Fatigue, unspecified type    2. Snores    3. Low testosterone    4. Witnessed episode of apnea    5. Bipolar disorder, in partial remission, most recent episode mixed    6. S/P knee surgery        Plan:   Jordan MONTOYA was seen today for establish care.    Diagnoses and all orders for this visit:    Fatigue, unspecified type  -     T3; Future  -     T4; Future  -     Vitamin D; Future  -     Vitamin B12; Future  -     Polysomnography 4 or more parameters with CPAP; Future  -     Comprehensive metabolic panel; Future    Snores  -     Polysomnography 4 or more parameters with CPAP; Future  -     Comprehensive metabolic panel; Future    Low testosterone  -     Follicle stimulating hormone; Future  -     Luteinizing hormone; Future  -     Prolactin; Future  -     T3; Future  -     T4; Future  -     PSA, Screening; Future  -     Comprehensive metabolic panel; Future    Witnessed episode of apnea  -     Polysomnography 4 or more parameters with CPAP; Future  -     Comprehensive metabolic panel; Future    Bipolar disorder, in partial remission, most recent episode mixed    S/P knee surgery    Other orders  -     aripiprazole (ABILIFY) 5 MG Tab; Take 1 tablet (5 mg total) by mouth once daily.    "This note will not be shared with the patient."  See above plan of care  Will return next visit on Monday and discuss all labs and treatment plan   I explained to him in detail my plan of care if he does need testosterone treatment    May need home studies first   If any thoughts of suicide or homicide occurs call 911   Testosterone level is 341 but will check for primary or secondary   RTC in one week   "

## 2017-06-21 LAB — T3 SERPL-MCNC: 98 NG/DL

## 2017-06-22 ENCOUNTER — TELEPHONE (OUTPATIENT)
Dept: INTERNAL MEDICINE | Facility: CLINIC | Age: 47
End: 2017-06-22

## 2017-06-22 DIAGNOSIS — F31.70 BIPOLAR DISORDER IN PARTIAL REMISSION, MOST RECENT EPISODE UNSPECIFIED TYPE: Primary | ICD-10-CM

## 2017-06-22 DIAGNOSIS — F31.70 BIPOLAR DISORDER IN PARTIAL REMISSION, MOST RECENT EPISODE UNSPECIFIED TYPE: ICD-10-CM

## 2017-06-22 RX ORDER — MIRTAZAPINE 7.5 MG/1
7.5 TABLET, FILM COATED ORAL NIGHTLY
Qty: 30 TABLET | Refills: 2 | Status: SHIPPED | OUTPATIENT
Start: 2017-06-22 | End: 2017-06-26

## 2017-06-22 RX ORDER — MIRTAZAPINE 7.5 MG/1
7.5 TABLET, FILM COATED ORAL NIGHTLY
Qty: 30 TABLET | Refills: 2 | Status: SHIPPED | OUTPATIENT
Start: 2017-06-22 | End: 2017-06-22 | Stop reason: SDUPTHER

## 2017-06-22 NOTE — TELEPHONE ENCOUNTER
Submitted PA for Abilify. Have not heard response back from insurance. Pt is requesting alternative be sent into pharmacy.

## 2017-06-26 ENCOUNTER — OFFICE VISIT (OUTPATIENT)
Dept: INTERNAL MEDICINE | Facility: CLINIC | Age: 47
End: 2017-06-26
Payer: MEDICAID

## 2017-06-26 VITALS
RESPIRATION RATE: 18 BRPM | BODY MASS INDEX: 24.44 KG/M2 | OXYGEN SATURATION: 97 % | WEIGHT: 165 LBS | HEART RATE: 87 BPM | HEIGHT: 69 IN | DIASTOLIC BLOOD PRESSURE: 88 MMHG | SYSTOLIC BLOOD PRESSURE: 124 MMHG

## 2017-06-26 DIAGNOSIS — F31.9 BIPOLAR AFFECTIVE DISORDER, REMISSION STATUS UNSPECIFIED: ICD-10-CM

## 2017-06-26 DIAGNOSIS — R79.89 LOW TESTOSTERONE LEVEL IN MALE: Primary | ICD-10-CM

## 2017-06-26 DIAGNOSIS — R79.89 ABNORMAL FSH LEVEL: ICD-10-CM

## 2017-06-26 PROCEDURE — 99999 PR PBB SHADOW E&M-EST. PATIENT-LVL III: CPT | Mod: PBBFAC,,, | Performed by: NURSE PRACTITIONER

## 2017-06-26 PROCEDURE — 99213 OFFICE O/P EST LOW 20 MIN: CPT | Mod: PBBFAC,PN | Performed by: NURSE PRACTITIONER

## 2017-06-26 PROCEDURE — 99214 OFFICE O/P EST MOD 30 MIN: CPT | Mod: S$PBB,,, | Performed by: NURSE PRACTITIONER

## 2017-06-26 RX ORDER — MIRTAZAPINE 30 MG/1
30 TABLET, FILM COATED ORAL NIGHTLY
Qty: 30 TABLET | Refills: 5 | Status: SHIPPED | OUTPATIENT
Start: 2017-06-26 | End: 2017-08-31

## 2017-06-26 NOTE — PATIENT INSTRUCTIONS
"  Bipolar Disorder  Bipolar disorder is an illness that causes strong mood swings between depression and kevin. It used to be called "manic depression." The mood swings are different from the normal ups and downs we all normally experience in our lives. They are more severe, last longer, and can interfere with work and relationships. These episodes are changes from our usual moods and behavior. Their severity can be mild, or drastic and explosive.  · In a manic episode, you may think fast and do things quickly. It may seem like you are getting a lot done. At first, this may feel very good. But in the extreme this can lead to a lifestyle that is disorganized, chaotic, and includes risky behavior (spending sprees, sexual acting-out, or drug use). In later stages, it may affect eating (no interest in food) and sleeping (unable to sleep for days at a time). Speech may speed up and become difficult for others to understand. You may appear to others as if you are in your own world.  · In a depressive episode, you may feel a lack of interest in normal activities. Sometimes there is sadness or guilt without any clear reason. Thinking may become slow and there can be a lack energy or feeling of hopelessness. Some people have thoughts of harming themselves at this stage. Thoughts can even turn to suicide.  Between these phases you may actually feel OK. This does not mean that the illness is gone. People with this disorder will usually have to treat it all of their life. Medicine and good care can greatly reduce the symptoms.  The exact cause of this illness is unknown. However, there is a genetic link that makes a person more likely to get this problem. Also, the use of drugs such as speed (amphetamine) and cocaine increase the chances of this illness appearing.  Home care  · On-going care and support helps people manage this disease.  Find a healthcare provider and therapist who meet your needs.  Seek help when you feel " like you may be heading into either a manic episode or a depressive state.  · Be sure to take your medicine and get regular blood work to check your the levels of medicine in your body.  Take the medicine and get the follow-up lab work as prescribed, even if you think you dont need to do it.  · Be certain to tell each of your healthcare providers about all of the prescription drugs, over-the-counter medicines, and supplements you take. Certain supplements interact with medicines and result in dangerous side effects.  You can also use your pharmacist as a resource person when you have questions about drug interactions.  · Talk with your family and trusted friends about your thoughts and feelings.  Ask them to help you recognize behavior changes early so you can get help and medicines can be adjusted.  · Alcohol and drugs can bring on an episode, and also make them worse  · If your life is severely impacted by this illness, the Americans with Disabilities Act (ADA) may provide help. The ADA protects people with chronic physical and mental health problems. If you are having trouble keeping jobs, managing workplace issues, or caring for yourself because of your bipolar disorder, contact your local ADA office to see if they can help. The [place] Department  of Justice operates a toll-free ADA information line at: 282.483.9749 (Voice); or 393-092-6406 (TTY).  They can help you locate a local office.    Follow-up care  Follow up with your doctor or therapist as advised. They can help you to find ways to improve your life.  Call 911  Call your healthcare provider right away if any of these occur:  · You have suicidal thoughts, a plan, and the means to  harm yourself  · Trouble breathing  · Confusion  · Drowsiness or trouble wakening  · Fainting or loss of consciousness  · Rapid heart rate, very low heart rate, or a new irregular heart rate  · Seizure  · New chest pain that becomes more severe, lasts longer, or spreads into your  shoulder, arm, neck, jaw, or back  When to seek medical advice  Call your healthcare provider right away if any of these occur:  · Feeling like your symptoms are getting worse (depression, agitation, excess energy)  · Unable to eat or sleep for more than 48 hours  · Feeling out of control (racing thoughts, poor concentration)  · Feeling like you want to harm yourself or another  · Being unable to care for yourself  Date Last Reviewed: 9/29/2015 © 2000-2016 [employer]. [address] [place] [address], [place] [address]. All rights reserved. This information is not intended as a substitute for professional medical care. Always follow your healthcare professional's instructions.

## 2017-06-26 NOTE — PROGRESS NOTES
Subjective:       Patient ID: Jordan Prater is a 46 y.o. male.    Chief Complaint: Follow-up (BW) and Medication Management    Patient is known, to me and presents with   Chief Complaint   Patient presents with    Follow-up     BW    Medication Management   .  Denies chest pain and shortness of breath.  Patient states that he definitely needs increase in remeron. Also here for levels   HPI  Review of Systems   Constitutional: Negative.  Negative for activity change, appetite change, chills, diaphoresis, fatigue, fever and unexpected weight change.   HENT: Negative.  Negative for congestion, ear discharge, ear pain, facial swelling, hearing loss, nosebleeds, postnasal drip, rhinorrhea, sinus pressure, sneezing, sore throat, tinnitus, trouble swallowing and voice change.    Eyes: Negative.  Negative for photophobia, pain, discharge, redness, itching and visual disturbance.   Respiratory: Negative.  Negative for apnea, cough, choking, chest tightness, shortness of breath, wheezing and stridor.    Cardiovascular: Negative.  Negative for chest pain, palpitations and leg swelling.   Gastrointestinal: Negative for abdominal distention, abdominal pain, anal bleeding, blood in stool, constipation, diarrhea, nausea and vomiting.   Genitourinary: Negative.  Negative for difficulty urinating, discharge, dysuria, enuresis, flank pain, frequency, hematuria, penile pain, penile swelling, scrotal swelling, testicular pain and urgency.   Musculoskeletal: Negative.  Negative for arthralgias, back pain, gait problem, joint swelling, myalgias, neck pain and neck stiffness.   Skin: Negative.  Negative for color change, pallor, rash and wound.   Neurological: Negative for dizziness, tremors, seizures, syncope, facial asymmetry, speech difficulty, weakness, light-headedness, numbness and headaches.   Hematological: Negative for adenopathy. Does not bruise/bleed easily.   Psychiatric/Behavioral: Positive for sleep disturbance. Negative  for agitation and suicidal ideas. The patient is nervous/anxious.        Objective:      Physical Exam   Constitutional: He is oriented to person, place, and time. He appears well-developed and well-nourished. No distress.   HENT:   Head: Normocephalic and atraumatic.   Right Ear: External ear normal.   Left Ear: External ear normal.   Nose: Nose normal.   Mouth/Throat: Oropharynx is clear and moist. No oropharyngeal exudate.   Eyes: Conjunctivae and EOM are normal. Pupils are equal, round, and reactive to light. Right eye exhibits no discharge. Left eye exhibits no discharge. No scleral icterus.   Neck: Normal range of motion. No JVD present. No tracheal deviation present. No thyromegaly present.   Cardiovascular: Normal rate, regular rhythm, normal heart sounds and intact distal pulses.  Exam reveals no gallop and no friction rub.    No murmur heard.  Pulmonary/Chest: Effort normal and breath sounds normal. No stridor. No respiratory distress. He has no wheezes. He has no rales. He exhibits no tenderness.   Abdominal: Soft. Bowel sounds are normal. He exhibits no distension and no mass. There is no tenderness. There is no rebound and no guarding.   Musculoskeletal: Normal range of motion. He exhibits no edema or tenderness.   Lymphadenopathy:     He has no cervical adenopathy.   Neurological: He is alert and oriented to person, place, and time. He has normal reflexes. He displays normal reflexes. No cranial nerve deficit. He exhibits normal muscle tone. Coordination normal.   Skin: Skin is warm and dry. Capillary refill takes less than 2 seconds. No rash noted. He is not diaphoretic. No erythema. No pallor.   Psychiatric: He has a normal mood and affect. His behavior is normal. Judgment and thought content normal.   Negative suicidal or homicidal ideations   Nursing note and vitals reviewed.      Assessment:       1. Low testosterone level in male    2. Abnormal FSH level    3. Bipolar affective disorder,  "remission status unspecified        Plan:   Jordan MONTOYA was seen today for follow-up and medication management.    Diagnoses and all orders for this visit:    Low testosterone level in male  -     MRI Brain W WO Contrast; Future    Abnormal FSH level  -     MRI Brain W WO Contrast; Future    Bipolar affective disorder, remission status unspecified  -     mirtazapine (REMERON) 30 MG tablet; Take 1 tablet (30 mg total) by mouth every evening.    "This note will not be shared with the patient."  Will do mri to rule out secondary causes such as pituitary adenoma  Will go up on remeron 30 mg   rtc as scheduled    "

## 2017-06-28 ENCOUNTER — HOSPITAL ENCOUNTER (OUTPATIENT)
Dept: RADIOLOGY | Facility: HOSPITAL | Age: 47
Discharge: HOME | End: 2017-06-28
Attending: NURSE PRACTITIONER
Payer: MEDICAID

## 2017-06-28 DIAGNOSIS — R79.89 LOW TESTOSTERONE LEVEL IN MALE: ICD-10-CM

## 2017-06-28 DIAGNOSIS — R79.89 ABNORMAL FSH LEVEL: ICD-10-CM

## 2017-06-28 PROCEDURE — 25500020 PHARM REV CODE 255: Performed by: NURSE PRACTITIONER

## 2017-06-28 PROCEDURE — A9585 GADOBUTROL INJECTION: HCPCS | Performed by: NURSE PRACTITIONER

## 2017-06-28 PROCEDURE — 70553 MRI BRAIN STEM W/O & W/DYE: CPT | Mod: 26,,, | Performed by: RADIOLOGY

## 2017-06-28 PROCEDURE — 70553 MRI BRAIN STEM W/O & W/DYE: CPT | Mod: TC

## 2017-06-28 RX ORDER — GADOBUTROL 604.72 MG/ML
4 INJECTION INTRAVENOUS
Status: COMPLETED | OUTPATIENT
Start: 2017-06-28 | End: 2017-06-28

## 2017-06-28 RX ADMIN — GADOBUTROL 4 ML: 604.72 INJECTION INTRAVENOUS at 03:06

## 2017-06-29 ENCOUNTER — TELEPHONE (OUTPATIENT)
Dept: INTERNAL MEDICINE | Facility: CLINIC | Age: 47
End: 2017-06-29

## 2017-06-29 DIAGNOSIS — R79.89 LOW TESTOSTERONE: Primary | ICD-10-CM

## 2017-06-29 RX ORDER — TESTOSTERONE CYPIONATE 1000 MG/10ML
100 INJECTION, SOLUTION INTRAMUSCULAR
Qty: 10 ML | Refills: 2 | Status: SHIPPED | OUTPATIENT
Start: 2017-06-29 | End: 2017-07-27 | Stop reason: SDUPTHER

## 2017-07-16 ENCOUNTER — HOSPITAL ENCOUNTER (OUTPATIENT)
Dept: SLEEP MEDICINE | Facility: HOSPITAL | Age: 47
Discharge: HOME | End: 2017-07-16
Attending: NURSE PRACTITIONER
Payer: MEDICAID

## 2017-07-16 DIAGNOSIS — G47.33 OBSTRUCTIVE SLEEP APNEA (ADULT) (PEDIATRIC): ICD-10-CM

## 2017-07-16 PROCEDURE — 95810 POLYSOM 6/> YRS 4/> PARAM: CPT

## 2017-07-26 ENCOUNTER — HOSPITAL ENCOUNTER (OUTPATIENT)
Dept: SLEEP MEDICINE | Facility: HOSPITAL | Age: 47
Discharge: HOME | End: 2017-07-26
Attending: NURSE PRACTITIONER
Payer: MEDICAID

## 2017-07-26 DIAGNOSIS — G47.33 OBSTRUCTIVE SLEEP APNEA (ADULT) (PEDIATRIC): ICD-10-CM

## 2017-07-26 PROCEDURE — 95811 POLYSOM 6/>YRS CPAP 4/> PARM: CPT

## 2017-07-27 ENCOUNTER — OFFICE VISIT (OUTPATIENT)
Dept: INTERNAL MEDICINE | Facility: CLINIC | Age: 47
End: 2017-07-27
Payer: MEDICAID

## 2017-07-27 VITALS
HEART RATE: 87 BPM | WEIGHT: 163 LBS | RESPIRATION RATE: 20 BRPM | DIASTOLIC BLOOD PRESSURE: 84 MMHG | SYSTOLIC BLOOD PRESSURE: 122 MMHG | BODY MASS INDEX: 24.14 KG/M2 | HEIGHT: 69 IN | OXYGEN SATURATION: 98 %

## 2017-07-27 DIAGNOSIS — R79.89 LOW TESTOSTERONE LEVEL IN MALE: Primary | ICD-10-CM

## 2017-07-27 DIAGNOSIS — F31.9 BIPOLAR AFFECTIVE DISORDER, REMISSION STATUS UNSPECIFIED: ICD-10-CM

## 2017-07-27 PROCEDURE — 99999 PR PBB SHADOW E&M-EST. PATIENT-LVL III: CPT | Mod: PBBFAC,,, | Performed by: NURSE PRACTITIONER

## 2017-07-27 PROCEDURE — 99214 OFFICE O/P EST MOD 30 MIN: CPT | Mod: S$PBB,,, | Performed by: NURSE PRACTITIONER

## 2017-07-27 PROCEDURE — 99213 OFFICE O/P EST LOW 20 MIN: CPT | Mod: PBBFAC,PN | Performed by: NURSE PRACTITIONER

## 2017-07-27 RX ORDER — TESTOSTERONE CYPIONATE 1000 MG/10ML
100 INJECTION, SOLUTION INTRAMUSCULAR
Qty: 10 ML | Refills: 2 | Status: SHIPPED | OUTPATIENT
Start: 2017-07-27 | End: 2017-08-15

## 2017-07-27 NOTE — PROGRESS NOTES
Subjective:       Patient ID: Jordan Prater is a 46 y.o. male.    Chief Complaint: Follow-up (x 1 month - meds)    Patient is known, to me and presents with   Chief Complaint   Patient presents with    Follow-up     x 1 month - meds   .  Denies chest pain and shortness of breath.  Patient presents for follow up bipolar and also low testosterone. Patient states that he has not started testosterone tx due to miscommunication. Also remeron is helping a lot    HPI  Review of Systems   Constitutional: Negative.  Negative for activity change, appetite change, chills, diaphoresis, fatigue, fever and unexpected weight change.   HENT: Negative.  Negative for congestion, ear discharge, ear pain, facial swelling, hearing loss, nosebleeds, postnasal drip, rhinorrhea, sinus pressure, sneezing, sore throat, tinnitus, trouble swallowing and voice change.    Eyes: Negative.  Negative for photophobia, pain, discharge, redness, itching and visual disturbance.   Respiratory: Negative.  Negative for apnea, cough, choking, chest tightness, shortness of breath, wheezing and stridor.    Cardiovascular: Negative.  Negative for chest pain, palpitations and leg swelling.   Gastrointestinal: Negative for abdominal distention, abdominal pain, anal bleeding, blood in stool, constipation, diarrhea, nausea and vomiting.   Genitourinary: Negative.  Negative for difficulty urinating, discharge, dysuria, enuresis, flank pain, frequency, hematuria, penile pain, penile swelling, scrotal swelling, testicular pain and urgency.   Musculoskeletal: Negative.  Negative for arthralgias, back pain, gait problem, joint swelling, myalgias, neck pain and neck stiffness.   Skin: Negative.  Negative for color change, pallor, rash and wound.   Neurological: Negative for dizziness, tremors, seizures, syncope, facial asymmetry, speech difficulty, weakness, light-headedness, numbness and headaches.   Hematological: Negative for adenopathy. Does not bruise/bleed  easily.   Psychiatric/Behavioral: Negative.  Negative for agitation, sleep disturbance and suicidal ideas. The patient is not nervous/anxious.        Objective:      Physical Exam   Constitutional: He is oriented to person, place, and time. He appears well-developed and well-nourished. No distress.   HENT:   Head: Normocephalic and atraumatic.   Right Ear: External ear normal.   Left Ear: External ear normal.   Nose: Nose normal.   Mouth/Throat: Oropharynx is clear and moist. No oropharyngeal exudate.   Eyes: Conjunctivae and EOM are normal. Pupils are equal, round, and reactive to light. Right eye exhibits no discharge. Left eye exhibits no discharge. No scleral icterus.   Neck: Normal range of motion. No JVD present. No tracheal deviation present. No thyromegaly present.   Cardiovascular: Normal rate, regular rhythm, normal heart sounds and intact distal pulses.  Exam reveals no gallop and no friction rub.    No murmur heard.  Pulmonary/Chest: Effort normal and breath sounds normal. No stridor. No respiratory distress. He has no wheezes. He has no rales. He exhibits no tenderness.   Abdominal: Soft. Bowel sounds are normal. He exhibits no distension and no mass. There is no tenderness. There is no rebound and no guarding.   Musculoskeletal: Normal range of motion. He exhibits no edema or tenderness.   Lymphadenopathy:     He has no cervical adenopathy.   Neurological: He is alert and oriented to person, place, and time. He has normal reflexes. He displays normal reflexes. No cranial nerve deficit. He exhibits normal muscle tone. Coordination normal.   Skin: Skin is warm and dry. Capillary refill takes less than 2 seconds. No rash noted. He is not diaphoretic. No erythema. No pallor.   Psychiatric: He has a normal mood and affect. His behavior is normal. Judgment and thought content normal.   Negative suicidal or homicidal ideations   Nursing note and vitals reviewed.      Assessment:       1. Low testosterone  "level in male    2. Bipolar affective disorder, remission status unspecified        Plan:   Jordan MONTOYA was seen today for follow-up.    Diagnoses and all orders for this visit:    Low testosterone level in male  -     testosterone cypionate (DEPOTESTOTERONE CYPIONATE) 100 mg/mL injection; Inject 1 mL (100 mg total) into the muscle every 14 (fourteen) days.  -     CBC auto differential; Future  -     Comprehensive metabolic panel; Future  -     TSH; Future  -     Lipid panel; Future  -     Testosterone Panel; Future    Bipolar affective disorder, remission status unspecified  -     CBC auto differential; Future  -     Comprehensive metabolic panel; Future    "This note will not be shared with the patient."  Will  Have his mother start injections and instructed on use  Will repeat labs in three months  RTC as scheduled  "

## 2017-07-27 NOTE — PATIENT INSTRUCTIONS
"  Bipolar Disorder  Bipolar disorder is an illness that causes strong mood swings between depression and kevin. It used to be called "manic depression." The mood swings are different from the normal ups and downs we all normally experience in our lives. They are more severe, last longer, and can interfere with work and relationships. These episodes are changes from our usual moods and behavior. Their severity can be mild, or drastic and explosive.  · In a manic episode, you may think fast and do things quickly. It may seem like you are getting a lot done. At first, this may feel very good. But in the extreme this can lead to a lifestyle that is disorganized, chaotic, and includes risky behavior (spending sprees, sexual acting-out, or drug use). In later stages, it may affect eating (no interest in food) and sleeping (unable to sleep for days at a time). Speech may speed up and become difficult for others to understand. You may appear to others as if you are in your own world.  · In a depressive episode, you may feel a lack of interest in normal activities. Sometimes there is sadness or guilt without any clear reason. Thinking may become slow and there can be a lack energy or feeling of hopelessness. Some people have thoughts of harming themselves at this stage. Thoughts can even turn to suicide.  Between these phases you may actually feel OK. This does not mean that the illness is gone. People with this disorder will usually have to treat it all of their life. Medicine and good care can greatly reduce the symptoms.  The exact cause of this illness is unknown. However, there is a genetic link that makes a person more likely to get this problem. Also, the use of drugs such as speed (amphetamine) and cocaine increase the chances of this illness appearing.  Home care  · On-going care and support helps people manage this disease.  Find a healthcare provider and therapist who meet your needs.  Seek help when you feel " like you may be heading into either a manic episode or a depressive state.  · Be sure to take your medicine and get regular blood work to check your the levels of medicine in your body.  Take the medicine and get the follow-up lab work as prescribed, even if you think you dont need to do it.  · Be certain to tell each of your healthcare providers about all of the prescription drugs, over-the-counter medicines, and supplements you take. Certain supplements interact with medicines and result in dangerous side effects.  You can also use your pharmacist as a resource person when you have questions about drug interactions.  · Talk with your family and trusted friends about your thoughts and feelings.  Ask them to help you recognize behavior changes early so you can get help and medicines can be adjusted.  · Alcohol and drugs can bring on an episode, and also make them worse  · If your life is severely impacted by this illness, the Americans with Disabilities Act (ADA) may provide help. The ADA protects people with chronic physical and mental health problems. If you are having trouble keeping jobs, managing workplace issues, or caring for yourself because of your bipolar disorder, contact your local ADA office to see if they can help. The [place] Department  of Justice operates a toll-free ADA information line at: 398.897.9363 (Voice); or 540-861-9901 (TTY).  They can help you locate a local office.    Follow-up care  Follow up with your doctor or therapist as advised. They can help you to find ways to improve your life.  Call 911  Call your healthcare provider right away if any of these occur:  · You have suicidal thoughts, a plan, and the means to  harm yourself  · Trouble breathing  · Confusion  · Drowsiness or trouble wakening  · Fainting or loss of consciousness  · Rapid heart rate, very low heart rate, or a new irregular heart rate  · Seizure  · New chest pain that becomes more severe, lasts longer, or spreads into your  shoulder, arm, neck, jaw, or back  When to seek medical advice  Call your healthcare provider right away if any of these occur:  · Feeling like your symptoms are getting worse (depression, agitation, excess energy)  · Unable to eat or sleep for more than 48 hours  · Feeling out of control (racing thoughts, poor concentration)  · Feeling like you want to harm yourself or another  · Being unable to care for yourself  Date Last Reviewed: 9/29/2015 © 2000-2016 [employer]. [address] [place] [address], [place] [address]. All rights reserved. This information is not intended as a substitute for professional medical care. Always follow your healthcare professional's instructions.

## 2017-08-10 ENCOUNTER — TELEPHONE (OUTPATIENT)
Dept: INTERNAL MEDICINE | Facility: CLINIC | Age: 47
End: 2017-08-10

## 2017-08-10 NOTE — TELEPHONE ENCOUNTER
Pt had sleep study done, shows SCOOTER (results in front to review)    Recommendation for CPAP thereapy on 8 cm H@) with Medium size resmed Nasal mask airfit N20 mask and heated humidification.      Please print script, will send to [facility] sleep lab

## 2017-08-14 ENCOUNTER — TELEPHONE (OUTPATIENT)
Dept: INTERNAL MEDICINE | Facility: CLINIC | Age: 47
End: 2017-08-14

## 2017-08-14 DIAGNOSIS — G47.33 OBSTRUCTIVE SLEEP APNEA (ADULT) (PEDIATRIC): Primary | ICD-10-CM

## 2017-08-14 NOTE — TELEPHONE ENCOUNTER
----- Message from Terrellkennybrenda Avelar sent at 8/10/2017  3:43 PM CDT -----  Contact: Dino  I've received a fax on patient to provide a Cpap but do not have a written order/Rx. Please fax one over to [facility] for Cpap @ 8cm h2o so that I can process it. Any questions, please call me @ [phone] or fax to [phone]. Thanks!

## 2017-08-15 ENCOUNTER — TELEPHONE (OUTPATIENT)
Dept: INTERNAL MEDICINE | Facility: CLINIC | Age: 47
End: 2017-08-15

## 2017-08-15 DIAGNOSIS — E29.1 HYPOGONADISM IN MALE: Primary | ICD-10-CM

## 2017-08-15 RX ORDER — TESTOSTERONE 20.25 MG/1.25G
20.25 GEL TOPICAL NIGHTLY
Qty: 75 G | Refills: 2 | Status: SHIPPED | OUTPATIENT
Start: 2017-08-15 | End: 2018-05-20

## 2017-08-16 ENCOUNTER — TELEPHONE (OUTPATIENT)
Dept: INTERNAL MEDICINE | Facility: CLINIC | Age: 47
End: 2017-08-16

## 2017-08-28 ENCOUNTER — TELEPHONE (OUTPATIENT)
Dept: INTERNAL MEDICINE | Facility: CLINIC | Age: 47
End: 2017-08-28

## 2017-08-28 DIAGNOSIS — F31.9 BIPOLAR AFFECTIVE DISORDER, REMISSION STATUS UNSPECIFIED: Primary | ICD-10-CM

## 2017-08-28 NOTE — TELEPHONE ENCOUNTER
----- Message from Jeannette Whitlock MA sent at 2017 12:43 PM CDT -----  Contact: Patient  Jordan Prater  MRN: [id number]  : 1970  PCP: Anahy Marino  Home Phone      [phone]  Work Phone      Not on file.  Mobile          [phone]  Home Phone      Not on file.      MESSAGE: Patient c/o having bad side effects with Remeron. He says he wakes up after having bad dreams then he is afraid to go back to sleep. He says he is very scared and has no reason to be.  Please call him @ [phone]

## 2017-08-30 NOTE — TELEPHONE ENCOUNTER
"Spoke with patients mother, she is wondering if you will start patient on any other medication since he has stopped the Remeron. States he is currently stuck in [place] and afraid he will have a "blow up" with the stress?     Please advise  "

## 2017-08-31 RX ORDER — BUSPIRONE HYDROCHLORIDE 5 MG/1
5 TABLET ORAL 2 TIMES DAILY
Qty: 60 TABLET | Refills: 5 | Status: SHIPPED | OUTPATIENT
Start: 2017-08-31 | End: 2018-02-08

## 2017-10-17 ENCOUNTER — TELEPHONE (OUTPATIENT)
Dept: INTERNAL MEDICINE | Facility: CLINIC | Age: 47
End: 2017-10-17

## 2017-10-17 DIAGNOSIS — M26.623 BILATERAL TEMPOROMANDIBULAR JOINT PAIN: Primary | ICD-10-CM

## 2017-10-17 NOTE — TELEPHONE ENCOUNTER
----- Message from Jeannette Whitlock MA sent at 10/17/2017  8:05 AM CDT -----  Contact: Patient  Jordan Prater  MRN: [id number]  : 1970  PCP: Anahy Marino  Home Phone      [phone]  Work Phone      Not on file.  Mobile          [phone]  Home Phone      Not on file.      MESSAGE: Patient c/o TMJ, requesting a referral to [facility].  Please advise--[phone]

## 2017-10-26 ENCOUNTER — TELEPHONE (OUTPATIENT)
Dept: INTERNAL MEDICINE | Facility: CLINIC | Age: 47
End: 2017-10-26

## 2017-10-26 DIAGNOSIS — M26.609 TEMPOROMANDIBULAR JOINT DISORDER (TMJ): ICD-10-CM

## 2017-10-26 DIAGNOSIS — M25.569 KNEE PAIN, UNSPECIFIED CHRONICITY, UNSPECIFIED LATERALITY: Primary | ICD-10-CM

## 2017-11-16 NOTE — TELEPHONE ENCOUNTER
Patient had orders sent for physical therapy but needs corrected diagnosis for TMJ. Advise    Fax new order to : [phone]

## 2018-01-05 ENCOUNTER — OFFICE VISIT (OUTPATIENT)
Dept: INTERNAL MEDICINE | Facility: CLINIC | Age: 48
End: 2018-01-05
Payer: COMMERCIAL

## 2018-01-05 VITALS
DIASTOLIC BLOOD PRESSURE: 60 MMHG | HEART RATE: 88 BPM | SYSTOLIC BLOOD PRESSURE: 90 MMHG | WEIGHT: 160.25 LBS | BODY MASS INDEX: 23.67 KG/M2 | RESPIRATION RATE: 16 BRPM | TEMPERATURE: 100 F

## 2018-01-05 DIAGNOSIS — J10.1 TYPE A INFLUENZA: Primary | ICD-10-CM

## 2018-01-05 LAB
CTP QC/QA: YES
FLUAV AG NPH QL: POSITIVE
FLUBV AG NPH QL: NEGATIVE

## 2018-01-05 PROCEDURE — 99213 OFFICE O/P EST LOW 20 MIN: CPT | Mod: PBBFAC,PN | Performed by: NURSE PRACTITIONER

## 2018-01-05 PROCEDURE — 99999 PR PBB SHADOW E&M-EST. PATIENT-LVL III: CPT | Mod: PBBFAC,,, | Performed by: NURSE PRACTITIONER

## 2018-01-05 PROCEDURE — 99213 OFFICE O/P EST LOW 20 MIN: CPT | Mod: S$PBB,,, | Performed by: NURSE PRACTITIONER

## 2018-01-05 PROCEDURE — 87804 INFLUENZA ASSAY W/OPTIC: CPT | Mod: 59,PBBFAC,PN | Performed by: NURSE PRACTITIONER

## 2018-01-05 RX ORDER — OSELTAMIVIR PHOSPHATE 75 MG/1
75 CAPSULE ORAL 2 TIMES DAILY
Qty: 10 CAPSULE | Refills: 0 | Status: SHIPPED | OUTPATIENT
Start: 2018-01-05 | End: 2018-01-10

## 2018-01-05 NOTE — PROGRESS NOTES
Subjective:       Patient ID: Jordan Prater is a 47 y.o. male.    Chief Complaint: Chills; Generalized Body Aches; Chest Congestion; and Fever (101)    Patient is known, to me and presents with   Chief Complaint   Patient presents with    Chills    Generalized Body Aches    Chest Congestion    Fever     101   .  Denies chest pain and shortness of breath.  Patient presents with flu like s/s. Patient had flu about two weeks ago and was given steroid shot and tamiflu. He now has new s/s of flu since yesterday    HPI  Review of Systems   Constitutional: Positive for chills, fatigue and fever.   HENT: Positive for congestion, postnasal drip and sore throat.    Eyes: Negative.    Respiratory: Positive for cough.    Cardiovascular: Negative.    Musculoskeletal: Positive for myalgias.   Skin: Negative.    Neurological: Negative.    Hematological: Negative.        Objective:      Physical Exam   Constitutional: He is oriented to person, place, and time. He appears well-developed and well-nourished. No distress.   HENT:   Head: Normocephalic and atraumatic.   Right Ear: Tympanic membrane mobility is abnormal.   Left Ear: Tympanic membrane mobility is abnormal.   Nose: Mucosal edema present.   Mouth/Throat: Posterior oropharyngeal erythema present. No oropharyngeal exudate.   Eyes: Conjunctivae are normal. Right eye exhibits no discharge. Left eye exhibits no discharge. No scleral icterus.   Neck: Normal range of motion. Neck supple. No JVD present. No tracheal deviation present. No thyromegaly present.   Cardiovascular: Normal rate, regular rhythm and normal heart sounds.    No murmur heard.  Pulmonary/Chest: Effort normal and breath sounds normal. No stridor. He has no wheezes.   Musculoskeletal: Normal range of motion. He exhibits no edema, tenderness or deformity.   Negative nuchal rigidity    Lymphadenopathy:     He has no cervical adenopathy.   Neurological: He is alert and oriented to person, place, and time. He  "displays normal reflexes. No cranial nerve deficit or sensory deficit. He exhibits normal muscle tone. Coordination normal.   Skin: Skin is warm and dry. Capillary refill takes less than 2 seconds. No rash noted. He is not diaphoretic. No erythema. No pallor.       Assessment:       1. Type A influenza        Plan:   Jordan MONTOYA was seen today for chills, generalized body aches, chest congestion and fever.    Diagnoses and all orders for this visit:    Type A influenza  -     POCT Influenza A/B  -     oseltamivir (TAMIFLU) 75 MG capsule; Take 1 capsule (75 mg total) by mouth 2 (two) times daily.    "This note will not be shared with the patient."  Increase fluids  Alternate motrin and tylenol  No work and rest  RTC as scheduled  "

## 2018-02-08 ENCOUNTER — TELEPHONE (OUTPATIENT)
Dept: INTERNAL MEDICINE | Facility: CLINIC | Age: 48
End: 2018-02-08

## 2018-02-08 RX ORDER — BUSPIRONE HYDROCHLORIDE 7.5 MG/1
7.5 TABLET ORAL 3 TIMES DAILY
Qty: 90 TABLET | Refills: 2 | Status: SHIPPED | OUTPATIENT
Start: 2018-02-08 | End: 2018-05-20

## 2018-02-14 ENCOUNTER — TELEPHONE (OUTPATIENT)
Dept: INTERNAL MEDICINE | Facility: CLINIC | Age: 48
End: 2018-02-14

## 2018-02-14 RX ORDER — ARIPIPRAZOLE 5 MG/1
5 TABLET ORAL DAILY
Qty: 30 TABLET | Refills: 2 | Status: SHIPPED | OUTPATIENT
Start: 2018-02-14 | End: 2018-04-23 | Stop reason: SDUPTHER

## 2018-02-14 NOTE — TELEPHONE ENCOUNTER
----- Message from Jeannette Whitlock MA sent at 2018 10:39 AM CST -----  Contact: Mother-Kadi Prater  MRN: [id number]  : 1970  PCP: Anahy Marino  Home Phone      [phone]  Work Phone      Not on file.  Mobile          [phone]  Home Phone      Not on file.      MESSAGE: Patient is requesting a script for Abilify.  Send to [facility].    Please advise--[phone]

## 2018-04-24 RX ORDER — ARIPIPRAZOLE 5 MG/1
5 TABLET ORAL DAILY
Qty: 30 TABLET | Refills: 5 | Status: SHIPPED | OUTPATIENT
Start: 2018-04-24 | End: 2018-06-01

## 2018-05-20 ENCOUNTER — HOSPITAL ENCOUNTER (EMERGENCY)
Facility: HOSPITAL | Age: 48
Discharge: HOME | End: 2018-05-20
Attending: EMERGENCY MEDICINE
Payer: COMMERCIAL

## 2018-05-20 DIAGNOSIS — R07.9 CHEST PAIN: ICD-10-CM

## 2018-05-20 DIAGNOSIS — K21.00 REFLUX ESOPHAGITIS: Primary | ICD-10-CM

## 2018-05-20 LAB
ALBUMIN SERPL BCP-MCNC: 4.1 G/DL
ALP SERPL-CCNC: 80 U/L
ALT SERPL W/O P-5'-P-CCNC: 21 U/L
ANION GAP SERPL CALC-SCNC: 15 MMOL/L
AST SERPL-CCNC: 22 U/L
BASOPHILS # BLD AUTO: 0.03 K/UL
BASOPHILS NFR BLD: 0.5 %
BILIRUB SERPL-MCNC: 0.4 MG/DL
BNP SERPL-MCNC: 27 PG/ML
BUN SERPL-MCNC: 15 MG/DL
CALCIUM SERPL-MCNC: 9 MG/DL
CHLORIDE SERPL-SCNC: 109 MMOL/L
CO2 SERPL-SCNC: 21 MMOL/L
CREAT SERPL-MCNC: 1.3 MG/DL
D DIMER PPP IA.FEU-MCNC: 0.22 MG/L FEU
DIFFERENTIAL METHOD: ABNORMAL
EOSINOPHIL # BLD AUTO: 0.1 K/UL
EOSINOPHIL NFR BLD: 1.1 %
ERYTHROCYTE [DISTWIDTH] IN BLOOD BY AUTOMATED COUNT: 12.5 %
EST. GFR  (AFRICAN AMERICAN): >60 ML/MIN/1.73 M^2
EST. GFR  (NON AFRICAN AMERICAN): >60 ML/MIN/1.73 M^2
GLUCOSE SERPL-MCNC: 117 MG/DL
HCT VFR BLD AUTO: 41.1 %
HGB BLD-MCNC: 14 G/DL
LYMPHOCYTES # BLD AUTO: 1.7 K/UL
LYMPHOCYTES NFR BLD: 25.4 %
MCH RBC QN AUTO: 31.2 PG
MCHC RBC AUTO-ENTMCNC: 34.1 G/DL
MCV RBC AUTO: 92 FL
MONOCYTES # BLD AUTO: 0.4 K/UL
MONOCYTES NFR BLD: 5.9 %
NEUTROPHILS # BLD AUTO: 4.5 K/UL
NEUTROPHILS NFR BLD: 67.1 %
PLATELET # BLD AUTO: 227 K/UL
PMV BLD AUTO: 9.7 FL
POTASSIUM SERPL-SCNC: 4 MMOL/L
PROT SERPL-MCNC: 7.4 G/DL
RBC # BLD AUTO: 4.49 M/UL
SODIUM SERPL-SCNC: 145 MMOL/L
TROPONIN I SERPL DL<=0.01 NG/ML-MCNC: <0.006 NG/ML
WBC # BLD AUTO: 6.62 K/UL

## 2018-05-20 PROCEDURE — 25000003 PHARM REV CODE 250: Performed by: EMERGENCY MEDICINE

## 2018-05-20 PROCEDURE — 84484 ASSAY OF TROPONIN QUANT: CPT

## 2018-05-20 PROCEDURE — 93005 ELECTROCARDIOGRAM TRACING: CPT

## 2018-05-20 PROCEDURE — 99284 EMERGENCY DEPT VISIT MOD MDM: CPT

## 2018-05-20 PROCEDURE — 93010 ELECTROCARDIOGRAM REPORT: CPT | Mod: ,,, | Performed by: INTERNAL MEDICINE

## 2018-05-20 PROCEDURE — 85379 FIBRIN DEGRADATION QUANT: CPT

## 2018-05-20 PROCEDURE — 85025 COMPLETE CBC W/AUTO DIFF WBC: CPT

## 2018-05-20 PROCEDURE — 27000221 HC OXYGEN, UP TO 24 HOURS

## 2018-05-20 PROCEDURE — 83880 ASSAY OF NATRIURETIC PEPTIDE: CPT

## 2018-05-20 PROCEDURE — 36415 COLL VENOUS BLD VENIPUNCTURE: CPT

## 2018-05-20 PROCEDURE — 80053 COMPREHEN METABOLIC PANEL: CPT

## 2018-05-20 PROCEDURE — 94760 N-INVAS EAR/PLS OXIMETRY 1: CPT

## 2018-05-20 RX ORDER — ASPIRIN 325 MG
325 TABLET ORAL
Status: COMPLETED | OUTPATIENT
Start: 2018-05-20 | End: 2018-05-20

## 2018-05-20 RX ADMIN — ASPIRIN 325 MG: 325 TABLET, COATED ORAL at 08:05

## 2018-05-20 RX ADMIN — LIDOCAINE HYDROCHLORIDE 50 ML: 20 SOLUTION ORAL; TOPICAL at 08:05

## 2018-05-21 VITALS
SYSTOLIC BLOOD PRESSURE: 104 MMHG | RESPIRATION RATE: 16 BRPM | HEART RATE: 82 BPM | TEMPERATURE: 98 F | DIASTOLIC BLOOD PRESSURE: 65 MMHG | WEIGHT: 180 LBS | OXYGEN SATURATION: 96 % | BODY MASS INDEX: 26.58 KG/M2

## 2018-05-21 NOTE — DISCHARGE INSTRUCTIONS
Home and rest.  Continue medications diet and activity as before.  Follow-up with your doctor as needed

## 2018-05-21 NOTE — ED PROVIDER NOTES
Encounter Date: 5/20/2018       History     Chief Complaint   Patient presents with    Chest Pain     This 47-year-old male presents emergency room complaining of left-sided chest pain which began about an hour prior to arrival.  He been out drinking all day with family.  He's had no cough cold or congestion.  He says he's had cardiac blockages in the past.  He denies any cigarette smoking or drug use.          Review of patient's allergies indicates:   Allergen Reactions    Chlorhexidine towelette Itching    Tramadol Itching    Codeine Other (See Comments)     delusional     Past Medical History:   Diagnosis Date    ACL tear     Bipolar 1 disorder     Tobacco abuse      Past Surgical History:   Procedure Laterality Date    ANTERIOR CRUCIATE LIGAMENT REPAIR Left 10/14/2016    APPENDECTOMY      HERNIA REPAIR      TONSILLECTOMY       Family History   Problem Relation Age of Onset    No Known Problems Mother     Heart attack Father     No Known Problems Maternal Grandmother     No Known Problems Maternal Grandfather     No Known Problems Paternal Grandmother     No Known Problems Paternal Grandfather      Social History   Substance Use Topics    Smoking status: Former Smoker     Years: 25.00    Smokeless tobacco: Current User     Types: Chew    Alcohol use Yes      Comment: occ     Review of Systems   Respiratory: Positive for cough and chest tightness.    All other systems reviewed and are negative.      Physical Exam     Initial Vitals   BP Pulse Resp Temp SpO2   05/20/18 2012 05/20/18 2012 05/20/18 2012 05/20/18 2024 05/20/18 2012   121/80 97 20 97.9 °F (36.6 °C) 96 %      MAP       05/20/18 2012       93.67         Physical Exam    Nursing note and vitals reviewed.  Constitutional: He appears well-developed and well-nourished.   HENT:   Head: Normocephalic.   Mouth/Throat: Oropharynx is clear and moist.   Eyes: Conjunctivae are normal. Pupils are equal, round, and reactive to light.   Neck:  Normal range of motion. Neck supple.   Cardiovascular: Normal heart sounds.   Pulmonary/Chest: Breath sounds normal.   Abdominal: Soft. Bowel sounds are normal.   Musculoskeletal: Normal range of motion. He exhibits no edema.   Neurological: He is alert and oriented to person, place, and time.   Skin: Skin is warm.   Psychiatric: He has a normal mood and affect.         ED Course   Procedures  Labs Reviewed - No data to display                               Clinical Impression:   The primary encounter diagnosis was Reflux esophagitis. A diagnosis of Chest pain was also pertinent to this visit.    Disposition:   Condition: Stable                        Cosme Blas MD  05/20/18 2238       Cosme Blas MD  06/26/18 1224       Cosme Blas MD  06/26/18 1331       Cosme Blas MD  06/26/18 1332

## 2018-06-01 ENCOUNTER — TELEPHONE (OUTPATIENT)
Dept: INTERNAL MEDICINE | Facility: CLINIC | Age: 48
End: 2018-06-01

## 2018-06-01 RX ORDER — ARIPIPRAZOLE 5 MG/1
TABLET ORAL
Qty: 30 TABLET | Refills: 5 | Status: SHIPPED | OUTPATIENT
Start: 2018-06-01 | End: 2018-12-14 | Stop reason: SDUPTHER

## 2018-12-17 RX ORDER — ARIPIPRAZOLE 5 MG/1
TABLET ORAL
Qty: 30 TABLET | Refills: 5 | Status: SHIPPED | OUTPATIENT
Start: 2018-12-17 | End: 2019-07-15 | Stop reason: SDUPTHER

## 2019-07-15 ENCOUNTER — TELEPHONE (OUTPATIENT)
Dept: INTERNAL MEDICINE | Facility: CLINIC | Age: 49
End: 2019-07-15

## 2019-07-15 RX ORDER — ARIPIPRAZOLE 5 MG/1
TABLET ORAL
Qty: 30 TABLET | Refills: 5 | Status: SHIPPED | OUTPATIENT
Start: 2019-07-15 | End: 2019-08-30

## 2019-07-15 NOTE — TELEPHONE ENCOUNTER
----- Message from Jeannette Whitlock MA sent at 7/15/2019  1:03 PM CDT -----  Contact: Mother  Patient is leaving to go back to work in [place],   Wants Mary Kate to put him back on anxiety meds,  She previously had prescribed but patient stopped taking it.  He was last seen for this in 2017.    Please Advise--[phone]    Send meds to [facility] ([place])

## 2019-08-30 ENCOUNTER — TELEPHONE (OUTPATIENT)
Dept: INTERNAL MEDICINE | Facility: CLINIC | Age: 49
End: 2019-08-30

## 2019-08-30 RX ORDER — ARIPIPRAZOLE 10 MG/1
10 TABLET ORAL DAILY
Qty: 30 TABLET | Refills: 5 | Status: SHIPPED | OUTPATIENT
Start: 2019-08-30 | End: 2020-08-29

## 2019-08-30 RX ORDER — ARIPIPRAZOLE 10 MG/1
10 TABLET, ORALLY DISINTEGRATING ORAL DAILY
Qty: 30 TABLET | Refills: 5 | Status: SHIPPED | OUTPATIENT
Start: 2019-08-30 | End: 2019-08-30

## 2019-08-30 NOTE — TELEPHONE ENCOUNTER
----- Message from Jeannette Whitlock MA sent at 8/30/2019 12:53 PM CDT -----  Contact: Patient  Patient is requesting an increase in abilify to 10mg.    Please Advise--[phone]    Send to [facility] ([place])

## 2020-04-02 ENCOUNTER — TELEPHONE (OUTPATIENT)
Dept: INTERNAL MEDICINE | Facility: CLINIC | Age: 50
End: 2020-04-02

## 2020-04-02 NOTE — TELEPHONE ENCOUNTER
----- Message from Jovanna Steward sent at 2020  3:31 PM CDT -----  Contact: self  Jordan Prater  MRN: [id number]  : 1970  PCP: Anahy Marino  Home Phone      [phone]  Work Phone      Not on file.  Mobile          [phone]  Home Phone      Not on file.      MESSAGE:   Pt would like a refill on Abilify called in to [facility] in [address] Fax# [phone]. Please return call @ [phone]. Thanks.

## 2023-01-31 ENCOUNTER — HOSPITAL ENCOUNTER (OUTPATIENT)
Dept: RADIOLOGY | Facility: HOSPITAL | Age: 53
Discharge: HOME | End: 2023-01-31
Attending: ORTHOPAEDIC SURGERY
Payer: COMMERCIAL

## 2023-01-31 ENCOUNTER — OFFICE VISIT (OUTPATIENT)
Dept: ORTHOPEDICS | Facility: CLINIC | Age: 53
End: 2023-01-31
Payer: COMMERCIAL

## 2023-01-31 VITALS — BODY MASS INDEX: 26.64 KG/M2 | HEIGHT: 69 IN | WEIGHT: 179.88 LBS

## 2023-01-31 DIAGNOSIS — S83.92XA SPRAIN OF LEFT KNEE, UNSPECIFIED LIGAMENT, INITIAL ENCOUNTER: ICD-10-CM

## 2023-01-31 DIAGNOSIS — M25.562 LEFT KNEE PAIN, UNSPECIFIED CHRONICITY: ICD-10-CM

## 2023-01-31 DIAGNOSIS — M25.562 LEFT KNEE PAIN, UNSPECIFIED CHRONICITY: Primary | ICD-10-CM

## 2023-01-31 PROCEDURE — 1159F MED LIST DOCD IN RCRD: CPT | Mod: CPTII,S$GLB,, | Performed by: ORTHOPAEDIC SURGERY

## 2023-01-31 PROCEDURE — 73564 XR KNEE ORTHO LEFT WITH FLEXION: ICD-10-PCS | Mod: 26,LT,, | Performed by: RADIOLOGY

## 2023-01-31 PROCEDURE — 3008F BODY MASS INDEX DOCD: CPT | Mod: CPTII,S$GLB,, | Performed by: ORTHOPAEDIC SURGERY

## 2023-01-31 PROCEDURE — 73564 X-RAY EXAM KNEE 4 OR MORE: CPT | Mod: 26,LT,, | Performed by: RADIOLOGY

## 2023-01-31 PROCEDURE — 99999 PR PBB SHADOW E&M-EST. PATIENT-LVL III: ICD-10-PCS | Mod: PBBFAC,,, | Performed by: ORTHOPAEDIC SURGERY

## 2023-01-31 PROCEDURE — 1159F PR MEDICATION LIST DOCUMENTED IN MEDICAL RECORD: ICD-10-PCS | Mod: CPTII,S$GLB,, | Performed by: ORTHOPAEDIC SURGERY

## 2023-01-31 PROCEDURE — 99999 PR PBB SHADOW E&M-EST. PATIENT-LVL III: CPT | Mod: PBBFAC,,, | Performed by: ORTHOPAEDIC SURGERY

## 2023-01-31 PROCEDURE — 73564 X-RAY EXAM KNEE 4 OR MORE: CPT | Mod: TC,LT

## 2023-01-31 PROCEDURE — 99204 PR OFFICE/OUTPT VISIT, NEW, LEVL IV, 45-59 MIN: ICD-10-PCS | Mod: S$GLB,,, | Performed by: ORTHOPAEDIC SURGERY

## 2023-01-31 PROCEDURE — 1160F PR REVIEW ALL MEDS BY PRESCRIBER/CLIN PHARMACIST DOCUMENTED: ICD-10-PCS | Mod: CPTII,S$GLB,, | Performed by: ORTHOPAEDIC SURGERY

## 2023-01-31 PROCEDURE — 3008F PR BODY MASS INDEX (BMI) DOCUMENTED: ICD-10-PCS | Mod: CPTII,S$GLB,, | Performed by: ORTHOPAEDIC SURGERY

## 2023-01-31 PROCEDURE — 99204 OFFICE O/P NEW MOD 45 MIN: CPT | Mod: S$GLB,,, | Performed by: ORTHOPAEDIC SURGERY

## 2023-01-31 PROCEDURE — 1160F RVW MEDS BY RX/DR IN RCRD: CPT | Mod: CPTII,S$GLB,, | Performed by: ORTHOPAEDIC SURGERY

## 2023-01-31 PROCEDURE — 73562 X-RAY EXAM OF KNEE 3: CPT | Mod: 26,RT,, | Performed by: RADIOLOGY

## 2023-01-31 PROCEDURE — 73562 XR KNEE ORTHO LEFT WITH FLEXION: ICD-10-PCS | Mod: 26,RT,, | Performed by: RADIOLOGY

## 2023-01-31 RX ORDER — MELOXICAM 15 MG/1
15 TABLET ORAL DAILY
Qty: 30 TABLET | Refills: 1 | Status: SHIPPED | OUTPATIENT
Start: 2023-01-31

## 2023-01-31 NOTE — LETTER
January 31, 2023      [facility] 5[address]  [address]  [facility] [address]  Phone: [phone]       Patient: Jordan Prater   YOB: 1970  Date of Visit: 01/31/2023    To Whom It May Concern:    Tha Prater  was at [facility] on 01/31/2023. The patient may return to work on 2/1/2023 with no restrictions. If you have any questions or concerns, or if I can be of further assistance, please do not hesitate to contact me.    Sincerely,    Cosme Gonzalez MD

## 2023-02-03 PROBLEM — S83.92XA SPRAIN OF LEFT KNEE: Status: ACTIVE | Noted: 2023-02-03

## 2023-02-03 NOTE — PROGRESS NOTES
Subjective:       Patient ID: Jordan Prater is a 52 y.o. male.    Chief Complaint:   Pain of the Left Knee  He comes in complaining of left knee pain.  Pain is mainly in the posterior and medial sides since he stepped in a hole and twisted his knee and felt a crunching 3 days ago.  Says he could not move it the next morning, and he used rest, ice and Tylenol.  He did not notice swelling or effusion.  He now is able to walk without supports.  However, pain is 8/10.  He does have a history of ACL reconstruction on this side in 2016.  No groin pain, distal numbness or tingling.    Past Medical History:   Diagnosis Date    ACL tear     Bipolar 1 disorder     Tobacco abuse      Past Surgical History:   Procedure Laterality Date    ANTERIOR CRUCIATE LIGAMENT REPAIR Left 10/14/2016    APPENDECTOMY      HERNIA REPAIR      TONSILLECTOMY       Family History   Problem Relation Age of Onset    No Known Problems Mother     Heart attack Father     No Known Problems Maternal Grandmother     No Known Problems Maternal Grandfather     No Known Problems Paternal Grandmother     No Known Problems Paternal Grandfather      Social History     Socioeconomic History    Marital status: Single    Number of children: 1    Years of education: 12   Tobacco Use    Smoking status: Former     Years: 25.00     Types: Cigarettes    Smokeless tobacco: Current     Types: Chew   Substance and Sexual Activity    Alcohol use: Yes     Comment: occ    Drug use: No    Sexual activity: Yes     Partners: Female       Current Outpatient Medications   Medication Sig Dispense Refill    ARIPiprazole (ABILIFY) 10 MG Tab Take 1 tablet (10 mg total) by mouth once daily. 30 tablet 5    meloxicam (MOBIC) 15 MG tablet Take 1 tablet (15 mg total) by mouth once daily. 30 tablet 1    multivitamin capsule Take 1 capsule by mouth once daily.       No current facility-administered medications for this visit.     Review of patient's allergies indicates:   Allergen  "Reactions    Chlorhexidine towelette Itching    Tramadol Itching    Codeine Other (See Comments)     delusional         Objective:      Vitals:    01/31/23 0944   Weight: 81.6 kg (179 lb 14.3 oz)   Height: 5' 9" (1.753 m)     Physical Exam  There is no significant coronal plane deformity, and an antalgic gait.  Active range of motion is 5-115 degrees with pain at extremes.  Fine crepitus with active extension.  Patellar mobility is not limited.  Boggy synovitis without effusion.  Global synovial tenderness predominates.  No instability to varus/valgus/Lachman's stressing.  No pain in the groin with flexion and internal rotation of the hip which is not limited.  Skin intact.  Distal neurovascular intact.  Flip test negative.    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  Stigmata of previous ACL tunnels appear appropriately positioned.  No acute findings.  Assessment:   Sprain, left knee  Plan:       He does not seem to have sustained repeat ACL insufficiency.  While he may have a meniscus tear, I think it is more likely that he simply has a sprain related to twisting the weight-bearing knee.  We will try meloxicam and see him in 1 week for follow-up.  The patient is prescribed meloxicam for short-term daily use, with intermittent as-needed use over the longer term.   Appropriate cautions are provided regarding possible GI and renal adverse effects.  The synergy of meloxicam and acetaminophen is described, and Tylenol Arthritis or the generic equivalent is recommended, as often as every 8 hours.  The patient is cautioned to avoid exceeding 3000 mg of Tylenol every 24 hours.  The record is reviewed regarding history renal or hepatic dysfunction, history of GI disease.    The patient's pathophysiology was explained in detail with reference to x-rays, models, other visual aids as appropriate.  " Treatment options were discussed in detail.  Questions were invited and answered to the patient's satisfaction.    Cosme Gonzalez MD  Orthopaedic Surgery
